# Patient Record
Sex: MALE | Race: WHITE | Employment: OTHER | URBAN - METROPOLITAN AREA
[De-identification: names, ages, dates, MRNs, and addresses within clinical notes are randomized per-mention and may not be internally consistent; named-entity substitution may affect disease eponyms.]

---

## 2017-02-21 ENCOUNTER — TELEPHONE (OUTPATIENT)
Dept: CASE MANAGEMENT | Facility: CLINIC | Age: 62
End: 2017-02-21

## 2017-08-25 DIAGNOSIS — B00.1 RECURRENT COLD SORES: ICD-10-CM

## 2017-08-25 RX ORDER — VALACYCLOVIR HYDROCHLORIDE 500 MG/1
TABLET, FILM COATED ORAL
Qty: 8 TABLET | Refills: 0 | Status: SHIPPED | OUTPATIENT
Start: 2017-08-25 | End: 2017-12-11

## 2017-08-25 NOTE — TELEPHONE ENCOUNTER
Patient calls requesting rx for Valtrex.  States he uses this rx on an as needed basis for cold sores.  He is starting to develop a cold sore now.    Valtrex 500mg      Last Written Prescription Date: 11/6/2011  Last Fill Quantity: 24,  # refills: 2   Last Office Visit with [facility], P or [facility] prescribing provider: 10/3/2016    Prescription approved per [facility] Refill Protocol.  Denise Joy RN

## 2017-12-09 DIAGNOSIS — I10 BENIGN ESSENTIAL HYPERTENSION: ICD-10-CM

## 2017-12-09 RX ORDER — LOSARTAN POTASSIUM AND HYDROCHLOROTHIAZIDE 12.5; 5 MG/1; MG/1
TABLET ORAL
Qty: 90 TABLET | Refills: 3 | OUTPATIENT
Start: 2017-12-09

## 2017-12-11 ENCOUNTER — OFFICE VISIT (OUTPATIENT)
Dept: FAMILY MEDICINE | Facility: CLINIC | Age: 62
End: 2017-12-11
Payer: COMMERCIAL

## 2017-12-11 VITALS
BODY MASS INDEX: 35.26 KG/M2 | SYSTOLIC BLOOD PRESSURE: 153 MMHG | OXYGEN SATURATION: 95 % | TEMPERATURE: 98.8 F | HEART RATE: 79 BPM | DIASTOLIC BLOOD PRESSURE: 82 MMHG | HEIGHT: 72 IN | WEIGHT: 260.3 LBS

## 2017-12-11 DIAGNOSIS — Z00.00 ROUTINE GENERAL MEDICAL EXAMINATION AT A HEALTH CARE FACILITY: Primary | ICD-10-CM

## 2017-12-11 DIAGNOSIS — Z13.0 SCREENING FOR DISORDER OF BLOOD AND BLOOD-FORMING ORGANS: ICD-10-CM

## 2017-12-11 DIAGNOSIS — Z13.1 SCREENING FOR DIABETES MELLITUS: ICD-10-CM

## 2017-12-11 DIAGNOSIS — I10 BENIGN ESSENTIAL HYPERTENSION: ICD-10-CM

## 2017-12-11 DIAGNOSIS — B00.1 RECURRENT COLD SORES: ICD-10-CM

## 2017-12-11 DIAGNOSIS — Z12.5 SCREENING PSA (PROSTATE SPECIFIC ANTIGEN): ICD-10-CM

## 2017-12-11 DIAGNOSIS — Z13.220 LIPID SCREENING: ICD-10-CM

## 2017-12-11 DIAGNOSIS — R39.198 ABNORMALITY OF URINATION: ICD-10-CM

## 2017-12-11 DIAGNOSIS — L42 PITYRIASIS ROSEA: ICD-10-CM

## 2017-12-11 LAB
ALBUMIN SERPL-MCNC: 4 G/DL (ref 3.4–5)
ALP SERPL-CCNC: 53 U/L (ref 40–150)
ALT SERPL W P-5'-P-CCNC: 41 U/L (ref 0–70)
ANION GAP SERPL CALCULATED.3IONS-SCNC: 10 MMOL/L (ref 3–14)
AST SERPL W P-5'-P-CCNC: 17 U/L (ref 0–45)
BILIRUB SERPL-MCNC: 0.4 MG/DL (ref 0.2–1.3)
BUN SERPL-MCNC: 20 MG/DL (ref 7–30)
CALCIUM SERPL-MCNC: 9.4 MG/DL (ref 8.5–10.1)
CHLORIDE SERPL-SCNC: 101 MMOL/L (ref 94–109)
CHOLEST SERPL-MCNC: 206 MG/DL
CO2 SERPL-SCNC: 28 MMOL/L (ref 20–32)
CREAT SERPL-MCNC: 0.76 MG/DL (ref 0.66–1.25)
ERYTHROCYTE [DISTWIDTH] IN BLOOD BY AUTOMATED COUNT: 13.2 % (ref 10–15)
GFR SERPL CREATININE-BSD FRML MDRD: >90 ML/MIN/1.7M2
GLUCOSE SERPL-MCNC: 118 MG/DL (ref 70–99)
HCT VFR BLD AUTO: 44.2 % (ref 40–53)
HDLC SERPL-MCNC: 59 MG/DL
HGB BLD-MCNC: 15 G/DL (ref 13.3–17.7)
LDLC SERPL CALC-MCNC: 132 MG/DL
MCH RBC QN AUTO: 32.5 PG (ref 26.5–33)
MCHC RBC AUTO-ENTMCNC: 33.9 G/DL (ref 31.5–36.5)
MCV RBC AUTO: 96 FL (ref 78–100)
NONHDLC SERPL-MCNC: 147 MG/DL
PLATELET # BLD AUTO: 242 10E9/L (ref 150–450)
POTASSIUM SERPL-SCNC: 4.3 MMOL/L (ref 3.4–5.3)
PROT SERPL-MCNC: 7.6 G/DL (ref 6.8–8.8)
PSA SERPL-ACNC: 0.42 UG/L (ref 0–4)
RBC # BLD AUTO: 4.62 10E12/L (ref 4.4–5.9)
SODIUM SERPL-SCNC: 139 MMOL/L (ref 133–144)
TRIGL SERPL-MCNC: 76 MG/DL
WBC # BLD AUTO: 5.9 10E9/L (ref 4–11)

## 2017-12-11 PROCEDURE — 85027 COMPLETE CBC AUTOMATED: CPT | Performed by: PHYSICIAN ASSISTANT

## 2017-12-11 PROCEDURE — 80053 COMPREHEN METABOLIC PANEL: CPT | Performed by: PHYSICIAN ASSISTANT

## 2017-12-11 PROCEDURE — G0103 PSA SCREENING: HCPCS | Performed by: PHYSICIAN ASSISTANT

## 2017-12-11 PROCEDURE — 80061 LIPID PANEL: CPT | Performed by: PHYSICIAN ASSISTANT

## 2017-12-11 PROCEDURE — 99396 PREV VISIT EST AGE 40-64: CPT | Performed by: PHYSICIAN ASSISTANT

## 2017-12-11 PROCEDURE — 36415 COLL VENOUS BLD VENIPUNCTURE: CPT | Performed by: PHYSICIAN ASSISTANT

## 2017-12-11 RX ORDER — VALACYCLOVIR HYDROCHLORIDE 500 MG/1
500 TABLET, FILM COATED ORAL DAILY
Qty: 90 TABLET | Refills: 0 | Status: ON HOLD | OUTPATIENT
Start: 2017-12-11 | End: 2019-06-03

## 2017-12-11 RX ORDER — VALACYCLOVIR HYDROCHLORIDE 500 MG/1
TABLET, FILM COATED ORAL
Qty: 8 TABLET | Refills: 0 | Status: SHIPPED | OUTPATIENT
Start: 2017-12-11 | End: 2017-12-11

## 2017-12-11 RX ORDER — LOSARTAN POTASSIUM AND HYDROCHLOROTHIAZIDE 12.5; 5 MG/1; MG/1
1 TABLET ORAL DAILY
Qty: 90 TABLET | Refills: 3 | Status: SHIPPED | OUTPATIENT
Start: 2017-12-11 | End: 2018-11-30

## 2017-12-11 NOTE — NURSING NOTE
Chief Complaint   Patient presents with     Physical     fasting       Initial /82 (BP Location: Right arm, Patient Position: Chair, Cuff Size: Adult Large)  Pulse 79  Temp 98.8  F (37.1  C) (Oral)  Ht 6' (1.829 m)  Wt 260 lb 4.8 oz (118.1 kg)  SpO2 95%  BMI 35.3 kg/m2 Estimated body mass index is 35.3 kg/(m^2) as calculated from the following:    Height as of this encounter: 6' (1.829 m).    Weight as of this encounter: 260 lb 4.8 oz (118.1 kg).  Medication Reconciliation: complete      Health Maintenance addressed:  NONE    n/a

## 2017-12-11 NOTE — PATIENT INSTRUCTIONS
Elevated BP today.   Will have come in for nurse only BP check x 2 and then follow-up for office visit in 1 month with BP diary.      ? Urge incontinence ?  Well refer to urology       Preventive Health Recommendations  Male Ages 50   64    Yearly exam:             See your health care provider every year in order to  o   Review health changes.   o   Discuss preventive care.    o   Review your medicines if your doctor has prescribed any.     Have a cholesterol test every 5 years, or more frequently if you are at risk for high cholesterol/heart disease.     Have a diabetes test (fasting glucose) every three years. If you are at risk for diabetes, you should have this test more often.     Have a colonoscopy at age 50, or have a yearly FIT test (stool test). These exams will check for colon cancer.      Talk with your health care provider about whether or not a prostate cancer screening test (PSA) is right for you.    You should be tested each year for STDs (sexually transmitted diseases), if you re at risk.     Shots: Get a flu shot each year. Get a tetanus shot every 10 years.     Nutrition:    Eat at least 5 servings of fruits and vegetables daily.     Eat whole-grain bread, whole-wheat pasta and brown rice instead of white grains and rice.     Talk to your provider about Calcium and Vitamin D.     Lifestyle    Exercise for at least 150 minutes a week (30 minutes a day, 5 days a week). This will help you control your weight and prevent disease.     Limit alcohol to one drink per day.     No smoking.     Wear sunscreen to prevent skin cancer.     See your dentist every six months for an exam and cleaning.     See your eye doctor every 1 to 2 years.

## 2017-12-11 NOTE — MR AVS SNAPSHOT
After Visit Summary   12/11/2017    Dedrick Yi    MRN: [id number]           Patient Information     Date Of Birth          1955        Visit Information        Provider Department      12/11/2017 10:00 AM Aaseby-Aguilera, Ramona Ann, PA-C [facility]        Today's Diagnoses     Routine general medical examination at a health care facility    -  1    Recurrent cold sores        Benign essential hypertension        Screening for diabetes mellitus        Lipid screening        Screening for disorder of blood and blood-forming organs        Screening PSA (prostate specific antigen)        Pityriasis rosea        Abnormality of urination          Care Instructions    Elevated BP today.   Will have come in for nurse only BP check x 2 and then follow-up for office visit in 1 month with BP diary.      ? Urge incontinence ?  Well refer to urology       Preventive Health Recommendations  Male Ages 50 - 64    Yearly exam:             See your health care provider every year in order to  o   Review health changes.   o   Discuss preventive care.    o   Review your medicines if your doctor has prescribed any.     Have a cholesterol test every 5 years, or more frequently if you are at risk for high cholesterol/heart disease.     Have a diabetes test (fasting glucose) every three years. If you are at risk for diabetes, you should have this test more often.     Have a colonoscopy at age 50, or have a yearly FIT test (stool test). These exams will check for colon cancer.      Talk with your health care provider about whether or not a prostate cancer screening test (PSA) is right for you.    You should be tested each year for STDs (sexually transmitted diseases), if you re at risk.     Shots: Get a flu shot each year. Get a tetanus shot every 10 years.     Nutrition:    Eat at least 5 servings of fruits and vegetables daily.     Eat whole-grain bread, whole-wheat pasta and brown rice instead of  white grains and rice.     Talk to your provider about Calcium and Vitamin D.     Lifestyle    Exercise for at least 150 minutes a week (30 minutes a day, 5 days a week). This will help you control your weight and prevent disease.     Limit alcohol to one drink per day.     No smoking.     Wear sunscreen to prevent skin cancer.     See your dentist every six months for an exam and cleaning.     See your eye doctor every 1 to 2 years.            Follow-ups after your visit        Additional Services     UROLOGY ADULT REFERRAL       Your provider has referred you to: [facility]: [facility] [phone]   [email or web address]    Please be aware that coverage of these services is subject to the terms and limitations of your health insurance plan.  Call member services at your health plan with any benefit or coverage questions.      Please bring the following with you to your appointment:    (1) Any X-Rays, CTs or MRIs which have been performed.  Contact the facility where they were done to arrange for  prior to your scheduled appointment.    (2) List of current medications  (3) This referral request   (4) Any documents/labs given to you for this referral                  Who to contact     If you have questions or need follow up information about today's clinic visit or your schedule please contact [facility] directly at [phone].  Normal or non-critical lab and imaging results will be communicated to you by MyChart, letter or phone within 4 business days after the clinic has received the results. If you do not hear from us within 7 days, please contact the clinic through MyChart or phone. If you have a critical or abnormal lab result, we will notify you by phone as soon as possible.  Submit refill requests through PlayPhone or call your pharmacy and they will forward the refill request to us. Please allow 3 business days for your refill to be  completed.          Additional Information About Your Visit        "[employer],"hart Information     [employer] gives you secure access to your electronic health record. If you see a primary care provider, you can also send messages to your care team and make appointments. If you have questions, please call your primary care clinic.  If you do not have a primary care provider, please call [phone] and they will assist you.        Care EveryWhere ID     This is your Care EveryWhere ID. This could be used by other organizations to access your [place] medical records  [id number]        Your Vitals Were     Pulse Temperature Height Pulse Oximetry BMI (Body Mass Index)       79 98.8  F (37.1  C) (Oral) 6' (1.829 m) 95% 35.3 kg/m2        Blood Pressure from Last 3 Encounters:   12/11/17 153/82   10/03/16 133/80   01/18/16 143/83    Weight from Last 3 Encounters:   12/11/17 260 lb 4.8 oz (118.1 kg)   10/03/16 249 lb 4.8 oz (113.1 kg)   01/18/16 257 lb (116.6 kg)              We Performed the Following     CBC with platelets     Comprehensive metabolic panel     Lipid panel reflex to direct LDL Fasting     Prostate spec antigen screen     UROLOGY ADULT REFERRAL          Today's Medication Changes          These changes are accurate as of: 12/11/17 10:43 AM.  If you have any questions, ask your nurse or doctor.               These medicines have changed or have updated prescriptions.        Dose/Directions    valACYclovir 500 MG tablet   Commonly known as:  VALTREX   This may have changed:    - how much to take  - how to take this  - when to take this  - additional instructions   Used for:  Recurrent cold sores   Changed by:  Aaseby-Aguilera, Ramona Ann, PA-C        Dose:  500 mg   Take 1 tablet (500 mg) by mouth daily   Quantity:  90 tablet   Refills:  0            Where to get your medicines      These medications were sent to [facility]/pharmacy #[id number] - [place], [place] - [address]  [address] [facility] [address]    Hours:   [facility] Phone:  [phone]     losartan-hydrochlorothiazide 50-12.5 MG per tablet    valACYclovir 500 MG tablet                Primary Care Provider Office Phone # Fax #    Ramona Ann Aaseby-Aguilera, PA-C [phone] [address]  [facility] [address]        Equal Access to Services     [facility] : Hadii aad ku hadasho Soomaali, waaxda luqadaha, qaybta kaalmada adeegyada, waxay idiin hayaan adeeg kharash la'aan . So [facility] [phone].    ATENCIÓN: Si habla espgurwinder, tiene a romeo disposición servicios gratuitos de asistencia lingüística. Gm al [phone].    We comply with applicable federal civil rights laws and [place] laws. We do not discriminate on the basis of race, color, national origin, age, disability, sex, sexual orientation, or gender identity.            Thank you!     Thank you for choosing [facility]  for your care. Our goal is always to provide you with excellent care. Hearing back from our patients is one way we can continue to improve our services. Please take a few minutes to complete the written survey that you may receive in the mail after your visit with us. Thank you!             Your Updated Medication List - Protect others around you: Learn how to safely use, store and throw away your medicines at www.disposemymeds.org.          This list is accurate as of: 12/11/17 10:43 AM.  Always use your most recent med list.                   Brand Name Dispense Instructions for use Diagnosis    losartan-hydrochlorothiazide 50-12.5 MG per tablet    HYZAAR    90 tablet    Take 1 tablet by mouth daily    Benign essential hypertension       valACYclovir 500 MG tablet    VALTREX    90 tablet    Take 1 tablet (500 mg) by mouth daily    Recurrent cold sores

## 2017-12-11 NOTE — PROGRESS NOTES
SUBJECTIVE:   CC: Dedrick Yi is an 62 year old male who presents for preventative health visit.     Physical   Annual:     Getting at least 3 servings of Calcium per day::  Yes    Bi-annual eye exam::  NO    Dental care twice a year::  Yes    Sleep apnea or symptoms of sleep apnea::  None    Diet::  Low salt    Frequency of exercise::  6-7 days/week    Duration of exercise::  15-30 minutes    Taking medications regularly::  Yes    Additional concerns today::  No                Today's PHQ-2 Score: PHQ-2 ( 1999 Pfizer) 12/11/2017   Q1: Little interest or pleasure in doing things 0   Q2: Feeling down, depressed or hopeless 0   PHQ-2 Score 0   Q1: Little interest or pleasure in doing things Not at all   Q2: Feeling down, depressed or hopeless Not at all   PHQ-2 Score 0       Abuse: Current or Past(Physical, Sexual or Emotional)- No  Do you feel safe in your environment - Yes    Social History   Substance Use Topics     Smoking status: Former Smoker     Quit date: 8/24/1987     Smokeless tobacco: Never Used     Alcohol use Yes      Comment: couple times a week     social    Last PSA:   PSA   Date Value Ref Range Status   11/24/2014 0.53 0 - 4 ug/L Final       Reviewed orders with patient. Reviewed health maintenance and updated orders accordingly - Yes      Reviewed and updated as needed this visit by clinical staff         Reviewed and updated as needed this visit by Provider            Review of Systems  C: NEGATIVE for fever, chills, change in weight  I: NEGATIVE for worrisome rashes, moles or lesions  E: NEGATIVE for vision changes or irritation  ENT: NEGATIVE for ear, mouth and throat problems  R: NEGATIVE for significant cough or SOB  CV: NEGATIVE for chest pain, palpitations or peripheral edema  GI: NEGATIVE for nausea, abdominal pain, heartburn, or change in bowel habits   male: negative for dysuria, hematuria, decreased urinary stream, erectile dysfunction, urethral discharge  M: NEGATIVE for significant  arthralgias or myalgia  N: NEGATIVE for weakness, dizziness or paresthesias  P: NEGATIVE for changes in mood or affect    OBJECTIVE:   There were no vitals taken for this visit.    Physical Exam  GENERAL: healthy, alert and no distress  EYES: Eyes grossly normal to inspection, PERRL and conjunctivae and sclerae normal  HENT: ear canals and TM's normal, nose and mouth without ulcers or lesions  NECK: no adenopathy, no asymmetry, masses, or scars and thyroid normal to palpation  RESP: lungs clear to auscultation - no rales, rhonchi or wheezes  CV: regular rate and rhythm, normal S1 S2, no S3 or S4, no murmur, click or rub, no peripheral edema and peripheral pulses strong  ABDOMEN: soft, nontender, no hepatosplenomegaly, no masses and bowel sounds normal   (male): normal male genitalia without lesions or urethral discharge, no hernia  RECTAL: normal sphincter tone, no rectal masses, prostate normal size, smooth, nontender without nodules or masses  MS: no gross musculoskeletal defects noted, no edema  SKIN: tanning flat oval patches of varying sizes in sy tree distribution   NEURO: Normal strength and tone, mentation intact and speech normal  PSYCH: mentation appears normal, affect normal/bright    ASSESSMENT/PLAN:   1. Routine general medical examination at a health care facility      2. Recurrent cold sores    - valACYclovir (VALTREX) 500 MG tablet; Take 1 tablet (500 mg) by mouth daily  Dispense: 90 tablet; Refill: 0    3. Benign essential hypertension  bp elevated today.   Will have come in for nurse only BP check x 2 and then follow-up for office visit in 1 month with BP diary.      - losartan-hydrochlorothiazide (HYZAAR) 50-12.5 MG per tablet; Take 1 tablet by mouth daily  Dispense: 90 tablet; Refill: 3    4. Screening for diabetes mellitus    - Comprehensive metabolic panel    5. Lipid screening    - Lipid panel reflex to direct LDL Fasting    6. Screening for disorder of blood and blood-forming  organs    - CBC with platelets    7. Screening PSA (prostate specific antigen)    - Prostate spec antigen screen    8. Pityriasis rosea  Reassurances given.      9. Abnormality of urination  Ed reports a sense of urgency periodically and then well pee very little.  Denies decreased stream.  Advised follow-up with urology   - UROLOGY ADULT REFERRAL    COUNSELING:   Reviewed preventive health counseling, as reflected in patient instructions       Regular exercise       Healthy diet/nutrition       Vision screening       Hearing screening       Aspirin Prophylaxsis           reports that he quit smoking about 30 years ago. He has never used smokeless tobacco.      Estimated body mass index is 33.81 kg/(m^2) as calculated from the following:    Height as of 10/3/16: 6' (1.829 m).    Weight as of 10/3/16: 249 lb 4.8 oz (113.1 kg).   Weight management plan: Discussed healthy diet and exercise guidelines and patient will follow up in 12 months in clinic to re-evaluate.    Counseling Resources:  ATP IV Guidelines  Pooled Cohorts Equation Calculator  FRAX Risk Assessment  ICSI Preventive Guidelines  Dietary Guidelines for Americans, 2010  USDA's MyPlate  ASA Prophylaxis  Lung CA Screening    Ramona Ann Aaseby-Aguilera, PA-C  [facility]  Answers for HPI/ROS submitted by the patient on 12/11/2017   PHQ-2 Score: 0

## 2017-12-13 ENCOUNTER — TELEPHONE (OUTPATIENT)
Dept: FAMILY MEDICINE | Facility: CLINIC | Age: 62
End: 2017-12-13

## 2017-12-13 NOTE — TELEPHONE ENCOUNTER
Have him follow up with Dorcas Thursday or Friday as BPs seem to be excessively high. Have him bring his home cuff to compare against ours. JH

## 2017-12-13 NOTE — TELEPHONE ENCOUNTER
"Dedrick Yi is a 62 year old male who calls with Log[employer] home BP's since office visit    BP Readings from Last 1 Encounters:   12/11/17 153/82     On waking today 195/100    Took losartan HCTZ at 7:30 AM, rechecked one hour later and systolic was 170-something\"  At 11:15 AM  159/95 .   Denies symptoms.     States:  I don't know if my machine is accurate, it tends to read about 10 points higher than office readings.  Even if off by 10 points, it is still too high.     Call back [phone]  - OK to leave detailed message.   Please advise.   Pro Yip, RN        "

## 2017-12-15 ENCOUNTER — OFFICE VISIT (OUTPATIENT)
Dept: NURSING | Facility: CLINIC | Age: 62
End: 2017-12-15
Payer: COMMERCIAL

## 2017-12-15 VITALS — SYSTOLIC BLOOD PRESSURE: 130 MMHG | DIASTOLIC BLOOD PRESSURE: 88 MMHG

## 2017-12-15 DIAGNOSIS — Z01.30 BP CHECK: Primary | ICD-10-CM

## 2017-12-15 PROCEDURE — 99207 ZZC NO CHARGE NURSE ONLY: CPT

## 2017-12-15 NOTE — PROGRESS NOTES
"  SUBJECTIVE:   Dedrick Yi is a 62 year old male who presents to clinic today for the following health issues:      Hypertension Follow-up      Outpatient blood pressures {ISCHECKIN}    Low Salt Diet: { :883631::\"no added salt\"}        Amount of exercise or physical activity: {Exercise frequency days per week:289461}    Problems taking medications regularly: {Med Problems:505145::\"No\"}    Medication side effects: {CHRONIC MED SIDE EFFECTS:527156::\"none\"}    Diet: { :474209}        {additional problems for provider to add:431916}    Problem list and histories reviewed & adjusted, as indicated.  Additional history: {NONE - AS DOCUMENTED:709658::\"as documented\"}    {HIST REVIEW/ LINKS 2:814803}    Reviewed and updated as needed this visit by clinical staffAllergies       Reviewed and updated as needed this visit by Provider         {PROVIDER CHARTING PREFERENCE:967883}    "

## 2017-12-15 NOTE — MR AVS SNAPSHOT
After Visit Summary   12/15/2017    Dedrick Yi    MRN: [id number]           Patient Information     Date Of Birth          1955        Visit Information        Provider Department      12/15/2017 10:30 AM EMILIE CORRAL/LPN [facility]        Today's Diagnoses     BP check    -  1       Follow-ups after your visit        Your next 10 appointments already scheduled     Dec 21, 2017  1:30 PM CST   New Patient Visit with Eloy Ward MD   [facility] ([facility])    [address]  [address]  [facility] [address] [phone]              Who to contact     If you have questions or need follow up information about today's clinic visit or your schedule please contact [facility] directly at [phone].  Normal or non-critical lab and imaging results will be communicated to you by MyChart, letter or phone within 4 business days after the clinic has received the results. If you do not hear from us within 7 days, please contact the clinic through MyChart or phone. If you have a critical or abnormal lab result, we will notify you by phone as soon as possible.  Submit refill requests through [employer] or call your pharmacy and they will forward the refill request to us. Please allow 3 business days for your refill to be completed.          Additional Information About Your Visit        MyChart Information     [employer] gives you secure access to your electronic health record. If you see a primary care provider, you can also send messages to your care team and make appointments. If you have questions, please call your primary care clinic.  If you do not have a primary care provider, please call [phone] and they will assist you.        Care EveryWhere ID     This is your Care EveryWhere ID. This could be used by other organizations to access your [place] medical records  [id number]          Blood Pressure from Last 3 Encounters:   12/15/17 130/88   12/11/17 153/82   10/03/16 133/80    Weight from Last 3 Encounters:   12/11/17 260 lb 4.8 oz (118.1 kg)   10/03/16 249 lb 4.8 oz (113.1 kg)   01/18/16 257 lb (116.6 kg)              Today, you had the following     No orders found for display       Primary Care Provider Office Phone # Fax #    Ramona Ann Aaseby-Aguilera, PA-C [phone] [address] [facility] [address] [facility] [address]        Equal Access to Services     [facility] CampusDOTTIE : Hadii stan drake hadasho Solindy, waaxda luqadaha, qaybta kaalmada adeyeisonyada, noe sanders . So [facility] [phone].    ATENCIÓN: Si habla español, tiene a romeo disposición servicios gratuitos de asistencia lingüística. [facility] [phone].    We comply with applicable federal civil rights laws and [place] laws. We do not discriminate on the basis of race, color, national origin, age, disability, sex, sexual orientation, or gender identity.            Thank you!     Thank you for choosing [facility]  for your care. Our goal is always to provide you with excellent care. Hearing back from our patients is one way we can continue to improve our services. Please take a few minutes to complete the written survey that you may receive in the mail after your visit with us. Thank you!             Your Updated Medication List - Protect others around you: Learn how to safely use, store and throw away your medicines at www.disposemymeds.org.          This list is accurate as of: 12/15/17 11:12 AM.  Always use your most recent med list.                   Brand Name Dispense Instructions for use Diagnosis    losartan-hydrochlorothiazide 50-12.5 MG per tablet    HYZAAR    90 tablet    Take 1 tablet by mouth daily    Benign essential hypertension       valACYclovir 500 MG tablet    VALTREX    90 tablet    Take 1 tablet (500 mg) by mouth daily    Recurrent cold sores

## 2018-03-28 ENCOUNTER — TELEPHONE (OUTPATIENT)
Dept: FAMILY MEDICINE | Facility: CLINIC | Age: 63
End: 2018-03-28

## 2018-03-28 NOTE — TELEPHONE ENCOUNTER
Patient calling with question about buying a vaporizer.  States he has had a cold for about a week and now it has settled into his chest.  Denies any other symptoms.  Suggested a humidifier at night, Vicks vapor rub on chest and feet, and add in mucinex to help with the congestion.  If not better in the next few days or worsening cough or fever then should be seen.      Pt expressed understanding and acceptance of the plan.  Pt had no further questions at this time.  Advised can call back to clinic at any time with concerns.       Julio Cesar Rangel RN, BSN

## 2018-05-21 ENCOUNTER — TELEPHONE (OUTPATIENT)
Dept: FAMILY MEDICINE | Facility: CLINIC | Age: 63
End: 2018-05-21

## 2018-05-21 NOTE — TELEPHONE ENCOUNTER
Rash is now in crook of elbow and under breast     Advised should be seen as this is not the same as when seen in December.     Pt scheduled for tomorrow with PCP    Sahra Pepper RN

## 2018-05-21 NOTE — TELEPHONE ENCOUNTER
Freddyhenrry walked into clinic requesting to speak to nurse.     He was last seen 12/11/17 with Dorcas. He showed her  a rash. He wants to know what kind of rash it is.     Requesting an Rx for the rash. I offered him an appt, but he stated she has already seen it.     Please call him at [phone]

## 2018-05-24 ENCOUNTER — OFFICE VISIT (OUTPATIENT)
Dept: FAMILY MEDICINE | Facility: CLINIC | Age: 63
End: 2018-05-24
Payer: COMMERCIAL

## 2018-05-24 VITALS
HEART RATE: 76 BPM | WEIGHT: 264.4 LBS | TEMPERATURE: 98.2 F | HEIGHT: 72 IN | BODY MASS INDEX: 35.81 KG/M2 | SYSTOLIC BLOOD PRESSURE: 130 MMHG | RESPIRATION RATE: 14 BRPM | DIASTOLIC BLOOD PRESSURE: 90 MMHG | OXYGEN SATURATION: 93 %

## 2018-05-24 DIAGNOSIS — A63.0 GENITAL WARTS: ICD-10-CM

## 2018-05-24 DIAGNOSIS — R73.09 ELEVATED GLUCOSE: ICD-10-CM

## 2018-05-24 DIAGNOSIS — L42 PITYRIASIS ROSEA: Primary | ICD-10-CM

## 2018-05-24 DIAGNOSIS — R05.9 COUGH: ICD-10-CM

## 2018-05-24 DIAGNOSIS — J31.0 OTHER CHRONIC RHINITIS: ICD-10-CM

## 2018-05-24 LAB — HBA1C MFR BLD: 5.9 % (ref 0–5.6)

## 2018-05-24 PROCEDURE — 99214 OFFICE O/P EST MOD 30 MIN: CPT | Performed by: PHYSICIAN ASSISTANT

## 2018-05-24 PROCEDURE — 36415 COLL VENOUS BLD VENIPUNCTURE: CPT | Performed by: PHYSICIAN ASSISTANT

## 2018-05-24 PROCEDURE — 83036 HEMOGLOBIN GLYCOSYLATED A1C: CPT | Performed by: PHYSICIAN ASSISTANT

## 2018-05-24 RX ORDER — TRIAMCINOLONE ACETONIDE 1 MG/G
CREAM TOPICAL
Qty: 30 G | Refills: 0 | Status: ON HOLD | OUTPATIENT
Start: 2018-05-24 | End: 2019-06-03

## 2018-05-24 RX ORDER — IMIQUIMOD 12.5 MG/.25G
CREAM TOPICAL
Qty: 12 PACKET | Refills: 3 | Status: ON HOLD | OUTPATIENT
Start: 2018-05-24 | End: 2019-06-03

## 2018-05-24 NOTE — PROGRESS NOTES
SUBJECTIVE:   Dedrick Yi is a 62 year old male who presents to clinic today for the following health issues:      Rash  Onset: since last OCt    Description:   Location: all over   Character: blotchy, red  Itching (Pruritis): YES    Progression of Symptoms:  same    Accompanying Signs & Symptoms:  Fever: no   Body aches or joint pain: no   Sore throat symptoms: no   Recent cold symptoms: yes, since Jan      History:   Previous similar rash: YES    Precipitating factors:   Exposure to similar rash: no   New exposures: None   Recent travel: no     Alleviating factors:    Also, he has additional complaints of gets up at night up to 3 times to urinate.  Started taking a over the counter supplement. That has helped this.  Was given a urology referral for this at last visit.  Wants to be on a medication that his dad takes to control urination.  Flomax?        Therapies Tried and outcome: none         Problem list and histories reviewed & adjusted, as indicated.  Additional history: as documented    Current Outpatient Prescriptions   Medication Sig Dispense Refill     imiquimod (ALDARA) 5 % cream Apply a small sized amount to warts or molluscum three times weekly at bedtime.   Wash off after 8 hours.   May use for up to 16 weeks. 12 packet 3     losartan-hydrochlorothiazide (HYZAAR) 50-12.5 MG per tablet Take 1 tablet by mouth daily 90 tablet 3     triamcinolone (KENALOG) 0.1 % cream Apply sparingly to affected area three times daily for 14 days. 30 g 0     valACYclovir (VALTREX) 500 MG tablet Take 1 tablet (500 mg) by mouth daily 90 tablet 0     BP Readings from Last 3 Encounters:   05/24/18 130/90   12/15/17 130/88   12/11/17 153/82    Wt Readings from Last 3 Encounters:   05/24/18 264 lb 6.4 oz (119.9 kg)   12/11/17 260 lb 4.8 oz (118.1 kg)   10/03/16 249 lb 4.8 oz (113.1 kg)                    Reviewed and updated as needed this visit by clinical staff  Tobacco  Allergies  Meds       Reviewed and updated as  needed this visit by Provider         ROS:  Constitutional, HEENT, cardiovascular, pulmonary, gi and gu systems are negative, except as otherwise noted.    OBJECTIVE:                                                    /90 (BP Location: Right arm, Patient Position: Chair, Cuff Size: Adult Large)  Pulse 76  Temp 98.2  F (36.8  C) (Oral)  Resp 14  Ht 6' (1.829 m)  Wt 264 lb 6.4 oz (119.9 kg)  SpO2 93%  BMI 35.86 kg/m2  Body mass index is 35.86 kg/(m^2).  GENERAL APPEARANCE: healthy, alert and no distress  HENT: ear canals and TM's normal and nose and mouth without ulcers or lesions  RESP: lungs clear to auscultation - no rales, rhonchi or wheezes  CV: regular rates and rhythm, normal S1 S2, no S3 or S4 and no murmur, click or rub  SKIN: pink oval patches on trunk and upper extremeties. In xmas tree distribution          ASSESSMENT/PLAN:                                                    1. Pityriasis rosea  Advised to use sparingly just in elbows where it itches when he gets hot.   - triamcinolone (KENALOG) 0.1 % cream; Apply sparingly to affected area three times daily for 14 days.  Dispense: 30 g; Refill: 0  - SKIN CARE REFERRAL    2. Cough    - triamcinolone (KENALOG) 0.1 % cream; Apply sparingly to affected area three times daily for 14 days.  Dispense: 30 g; Refill: 0    3. Other chronic rhinitis  Advised zyrtec daily     4. Elevated glucose  5.9   - Hemoglobin A1c    5. Genital warts  Has used in the past and workes well.  Now a new lesion   - imiquimod (ALDARA) 5 % cream; Apply a small sized amount to warts or molluscum three times weekly at bedtime.   Wash off after 8 hours.   May use for up to 16 weeks.  Dispense: 12 packet; Refill: 3      There are no Patient Instructions on file for this visit.    Ramona Ann Aaseby-Aguilera, PA-C  [facility]

## 2018-05-24 NOTE — MR AVS SNAPSHOT
After Visit Summary   5/24/2018    Dedrick Yi    MRN: [id number]           Patient Information     Date Of Birth          1955        Visit Information        Provider Department      5/24/2018 9:45 AM Aaseby-Aguilera, Ramona Ann, PA-C [facility]        Today's Diagnoses     Pityriasis rosea    -  1    Cough        Other chronic rhinitis        Elevated glucose           Follow-ups after your visit        Additional Services     SKIN CARE REFERRAL       Your provider has referred you to: [facility]: [facility] [place] [phone]  [email or web address]     Please be aware that coverage of these services is subject to the terms and limitations of your health insurance plan.  Please check with your insurance prior to the appointment to ensure appropriate coverage for any services considered cosmetic in nature or not medically necessary.    Please bring the following with you to your appointment:    (1) Any X-Rays, CTs or MRIs which have been performed.  Contact the facility where they were done to arrange for  prior to your scheduled appointment.  Any new CT, MRI or other procedures ordered by your specialist must be performed at a [facility] facility or coordinated by your clinic's referral office.  (2) List of current medications  (3) This referral request   (4) Any documents/labs given to you for this referral                  Who to contact     If you have questions or need follow up information about today's clinic visit or your schedule please contact [facility] directly at [phone].  Normal or non-critical lab and imaging results will be communicated to you by MyChart, letter or phone within 4 business days after the clinic has received the results. If you do not hear from us within 7 days, please contact the clinic through MyChart or phone. If you have a critical or abnormal lab result, we will  notify you by phone as soon as possible.  Submit refill requests through [email or web address] or call your pharmacy and they will forward the refill request to us. Please allow 3 business days for your refill to be completed.          Additional Information About Your Visit        [employer] Information     [email or web address] gives you secure access to your electronic health record. If you see a primary care provider, you can also send messages to your care team and make appointments. If you have questions, please call your primary care clinic.  If you do not have a primary care provider, please call [phone] and they will assist you.        Care EveryWhere ID     This is your Care EveryWhere ID. This could be used by other organizations to access your [facility] medical records  [id number]        Your Vitals Were     Pulse Temperature Respirations Height Pulse Oximetry BMI (Body Mass Index)    76 98.2  F (36.8  C) (Oral) 14 6' (1.829 m) 93% 35.86 kg/m2       Blood Pressure from Last 3 Encounters:   05/24/18 130/90   12/15/17 130/88   12/11/17 153/82    Weight from Last 3 Encounters:   05/24/18 264 lb 6.4 oz (119.9 kg)   12/11/17 260 lb 4.8 oz (118.1 kg)   10/03/16 249 lb 4.8 oz (113.1 kg)              We Performed the Following     Hemoglobin A1c     SKIN CARE REFERRAL          Today's Medication Changes          These changes are accurate as of 5/24/18 11:15 AM.  If you have any questions, ask your nurse or doctor.               Start taking these medicines.        Dose/Directions    triamcinolone 0.1 % cream   Commonly known as:  KENALOG   Used for:  Pityriasis rosea, Cough   Started by:  Aaseby-Aguilera, Ramona Ann, PA-C        Apply sparingly to affected area three times daily for 14 days.   Quantity:  30 g   Refills:  0            Where to get your medicines      These medications were sent to [facility]/pharmacy #[id number] - [place], [place] - [address]  [address], [facility] [address]    Hours:  [facility]  Phone:  [phone]     triamcinolone 0.1 % cream                Primary Care Provider Office Phone # Fax #    Ramona Ann Aaseby-Aguilera, PA-C [phone] [id number]  [facility] [address]        Equal Access to Services     DES SANCHEZ : Hadii aad ku hadasho Soomaali, waaxda luqadaha, qaybta kaalmada adeegyada, waxay beverly ovidion adeyeison rachel [place]. So [facility] [phone].    ATENCIÓN: Si habla español, tiene a romeo disposición servicios gratuitos de asistencia lingüística. Llame al [phone].    We comply with applicable federal civil rights laws and [place] laws. We do not discriminate on the basis of race, color, national origin, age, disability, sex, sexual orientation, or gender identity.            Thank you!     Thank you for choosing [facility]  for your care. Our goal is always to provide you with excellent care. Hearing back from our patients is one way we can continue to improve our services. Please take a few minutes to complete the written survey that you may receive in the mail after your visit with us. Thank you!             Your Updated Medication List - Protect others around you: Learn how to safely use, store and throw away your medicines at www.disposemymeds.org.          This list is accurate as of 5/24/18 11:15 AM.  Always use your most recent med list.                   Brand Name Dispense Instructions for use Diagnosis    losartan-hydrochlorothiazide 50-12.5 MG per tablet    HYZAAR    90 tablet    Take 1 tablet by mouth daily    Benign essential hypertension       triamcinolone 0.1 % cream    KENALOG    30 g    Apply sparingly to affected area three times daily for 14 days.    Pityriasis rosea, Cough       valACYclovir 500 MG tablet    VALTREX    90 tablet    Take 1 tablet (500 mg) by mouth daily    Recurrent cold sores

## 2018-05-24 NOTE — NURSING NOTE
Chief Complaint   Patient presents with     Derm Problem       Initial /90 (BP Location: Right arm, Patient Position: Chair, Cuff Size: Adult Large)  Pulse 76  Temp 98.2  F (36.8  C) (Oral)  Resp 14  Ht 6' (1.829 m)  Wt 264 lb 6.4 oz (119.9 kg)  SpO2 93%  BMI 35.86 kg/m2 Estimated body mass index is 35.86 kg/(m^2) as calculated from the following:    Height as of this encounter: 6' (1.829 m).    Weight as of this encounter: 264 lb 6.4 oz (119.9 kg).  Medication Reconciliation: complete      Health Maintenance addressed:  NONE    N/a    BELEM Abdalla

## 2018-06-01 DIAGNOSIS — R05.9 COUGH: ICD-10-CM

## 2018-06-01 DIAGNOSIS — L42 PITYRIASIS ROSEA: ICD-10-CM

## 2018-06-01 NOTE — TELEPHONE ENCOUNTER
Spoke with patient and he has been using all over his his body not just in the elbow area as directed.  He is using it on both sides of his back and under his breast area where it is sweaty.  He is using this BID and has not made an appt with Skin Care.  He states he was told to schedule only if this cream is not helping which it is helping.    Please advise    Julio Cesar Rangel RN, BSN

## 2018-06-01 NOTE — TELEPHONE ENCOUNTER
"Requested Prescriptions   Pending Prescriptions Disp Refills     triamcinolone (KENALOG) 0.1 % cream [Pharmacy Med Name: TRIAMCINOLONE 0.1% CREAM] 30 g 0    Last Written Prescription Date:  05/24/2018  Last Fill Quantity: 30 grams,  # refills: 0   Last office visit: 5/24/2018 with prescribing provider:  05/24/2018   Future Office Visit:     Sig: APPLY SPARINGLY TO AFFECTED AREA THREE TIMES DAILY FOR 14 DAYS.    Topical Steroid Protocol Passed    6/1/2018 12:49 PM       Passed - Patient is age 6 or older       Passed - Authorizing prescriber's most recent note related to this medication read.       Passed - High potency steroid not ordered       Passed - Recent (12 mo) or future (30 days) visit within the authorizing provider's specialty    Patient had office visit in the last 12 months or has a visit in the next 30 days with authorizing provider or within the authorizing provider's specialty.  See \"Patient Info\" tab in inbasket, or \"Choose Columns\" in Meds & Orders section of the refill encounter.              Peter Kingston XRT  "

## 2018-06-04 RX ORDER — TRIAMCINOLONE ACETONIDE 1 MG/G
CREAM TOPICAL
Qty: 30 G | Refills: 0 | OUTPATIENT
Start: 2018-06-04

## 2018-06-07 ENCOUNTER — OFFICE VISIT (OUTPATIENT)
Dept: FAMILY MEDICINE | Facility: CLINIC | Age: 63
End: 2018-06-07
Payer: COMMERCIAL

## 2018-06-07 VITALS — HEART RATE: 85 BPM | SYSTOLIC BLOOD PRESSURE: 141 MMHG | OXYGEN SATURATION: 95 % | DIASTOLIC BLOOD PRESSURE: 78 MMHG

## 2018-06-07 DIAGNOSIS — B36.0 TINEA VERSICOLOR: Primary | ICD-10-CM

## 2018-06-07 DIAGNOSIS — L30.9 DERMATITIS: ICD-10-CM

## 2018-06-07 LAB
KOH PREP SPEC: ABNORMAL
SPECIMEN SOURCE: ABNORMAL

## 2018-06-07 PROCEDURE — 99214 OFFICE O/P EST MOD 30 MIN: CPT | Mod: 25 | Performed by: FAMILY MEDICINE

## 2018-06-07 PROCEDURE — 87220 TISSUE EXAM FOR FUNGI: CPT | Performed by: FAMILY MEDICINE

## 2018-06-07 RX ORDER — FLUCONAZOLE 150 MG/1
300 TABLET ORAL
Qty: 8 TABLET | Refills: 0 | Status: SHIPPED | OUTPATIENT
Start: 2018-06-07 | End: 2018-06-29

## 2018-06-07 NOTE — PROGRESS NOTES
[facility] - PRIMARY CARE SKIN    CC : Rash   SUBJECTIVE:   Dedrick Yi is a 62 year old male who presents to clinic today because of a(n) rash beginning approximately 1 year ago on the back. His primary care provider diagnosed it as pityriasis rosea when evaluated in Dec 2017. The rash began to spread onto arms and become itchy.    Pruritic : YES - itchy in antecubital fossae and side of chest.  Symptoms appear to be : worsening.  Aggravating factors : sweating.  Relieving factors : none identified.    Previous similar history : NO.  Recent exposure history : none known. He does wood working in an A/C shop.  Any other family members with similar symptoms : NO.  Other current medications : No new medications.    Therapies tried : triamcinolone 0.1% cream. Minimal relief of itchiness.  Products used : Dove body lotion and body wash.    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)    Patient Active Problem List   Diagnosis     Plantar fascial fibromatosis     Erectile dysfunction     CARDIOVASCULAR SCREENING; LDL GOAL LESS THAN 130     Neck pain     Recurrent cold sores     Hypertension goal BP (blood pressure) < 140/90     Advanced directives, counseling/discussion     Impaired fasting glucose     Low back pain     Anal warts       Past Medical History:   Diagnosis Date     Anal warts 1/21/2015     NO ACTIVE PROBLEMS     Past Surgical History:   Procedure Laterality Date     HC TOOTH EXTRACTION W/FORCEP       SURGICAL HISTORY OF -       cervical disk herniation      Social History   Substance Use Topics     Smoking status: Former Smoker     Quit date: 8/24/1987     Smokeless tobacco: Never Used     Alcohol use Yes      Comment: couple times a week    Family History     Problem (# of Occurrences) Relation (Name,Age of Onset)     Family History Negative (1) Father    Hypertension (1) Mother           Prescription Medications as of 6/7/2018             imiquimod (ALDARA) 5 % cream Apply a small sized amount to warts or molluscum three times weekly at bedtime.   Wash off after 8 hours.   May use for up to 16 weeks.    losartan-hydrochlorothiazide (HYZAAR) 50-12.5 MG per tablet Take 1 tablet by mouth daily    triamcinolone (KENALOG) 0.1 % cream Apply sparingly to affected area three times daily for 14 days.    valACYclovir (VALTREX) 500 MG tablet Take 1 tablet (500 mg) by mouth daily          No Known Allergies     INTEGUMENTARY/SKIN: POSITIVE for rash    ROS : 14 point review of systems was negative except the symptoms listed above in the HPI.    This document serves as a record of the services and decisions personally performed and made by Akilah Buck MD. It was created on her behalf by Cristian Vallejo, a trained medical scribe.  The creation of this document is based on the scribe's personal observations and the provider's statements to the medical scribe.  Cristian Vallejo, June 7, 2018 10:49 AM      OBJECTIVE:   GENERAL: healthy, alert and no distress  SKIN: Cohen Skin Type - II.  Neck, Trunk and Arms were examined. The dermatoscope was used to help evaluate pigmented lesions.  Skin Pertinent Findings:  Trunk : Macular brownish-red patches involving most of the upper back and scattered distribution  onto lower back and extending onto anterior chest and upper chest most consistent with tinea versicolor    Legs, arms, dorsa of hands, face : Clear.    Diagnostic Test Results:  No results found for this or any previous visit (from the past 24 hour(s)).          ASSESSMENT:     Encounter Diagnoses   Name Primary?     Dermatitis Yes     Tinea versicolor          PLAN:   Patient Instructions   FUTURE APPOINTMENTS  Follow up as needed.    Discontinue use of topical steroid.    FLUCONAZOLE FOR TINEA VERSICOLOR INSTRUCTIONS  1. Take 2 capsules/tablets  of fluconazole 150 mg with a carbonated beverage or orange juice. This increases the absorption by your body of the medication.    2. One hour after taking, exercise to a light sweat, as the medication works best when secreted in sweat. Do not shower for 6 hours, to allow the medication time to work.    3. Repeat this same process every 7 days for 4 weeks in total.     Note: Although the problem has been adequately treated, the skin discoloration may not resolve for several weeks.    Recurrent Cases:  It is very common for this to recur with exercise or hot weather. If this continues to be a problem, Selsun blue, ketoconazole or another shampoo may be used as a body wash once a week or so to prevent it from coming back.    The patient was counseled to use products free of fragrance, dyes, and plants. The importance of using bland cleansers and the regular use of heavy bland emollient creams was impressed upon the patient.      PROCEDURES:   None.    TT : 20 minutes.  CT : 15 minutes.      The information in this document, created by the medical scribe for me, accurately reflects the services I personally performed and the decisions made by me. I have reviewed and approved this document for accuracy prior to leaving the patient care area.  Akilah Buck MD June 7, 2018 10:49 AM  [facility]

## 2018-06-07 NOTE — PATIENT INSTRUCTIONS
FUTURE APPOINTMENTS  Follow up as needed.    Discontinue use of topical steroid.    FLUCONAZOLE FOR TINEA VERSICOLOR INSTRUCTIONS  1. Take 2 capsules/tablets of fluconazole 150 mg with a carbonated beverage or orange juice. This increases the absorption by your body of the medication.    2. One hour after taking, exercise to a light sweat, as the medication works best when secreted in sweat. Do not shower for 6 hours, to allow the medication time to work.    3. Repeat this same process every 7 days for 4 weeks in total.     Note: Although the problem has been adequately treated, the skin discoloration may not resolve for several weeks.    Recurrent Cases:  It is very common for this to recur with exercise or hot weather. If this continues to be a problem, Selsun blue, ketoconazole or another shampoo may be used as a body wash once a week or so to prevent it from coming back.

## 2018-06-07 NOTE — LETTER
6/7/2018         RE: Dedrick Yi  [address] [place] [address]        Dear Colleague,    Thank you for referring your patient, Dedrick Yi, to the [facility] [place]. Please see a copy of my visit note below.    [facility] - PRIMARY CARE SKIN    CC : Rash   SUBJECTIVE:   Dedrick Yi is a 62 year old male who presents to clinic today because of a(n) rash beginning approximately 1 year ago on the back. His primary care provider diagnosed it as pityriasis rosea when evaluated in Dec 2017. The rash began to spread onto arms and become itchy.    Pruritic : YES - itchy in antecubital fossae and side of chest.  Symptoms appear to be : worsening.  Aggravating factors : sweating.  Relieving factors : none identified.    Previous similar history : NO.  Recent exposure history : none known. He does wood working in an A/C shop.  Any other family members with similar symptoms : NO.  Other current medications : No new medications.    Therapies tried : triamcinolone 0.1% cream. Minimal relief of itchiness.  Products used : Dove body lotion and body wash.    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)    Patient Active Problem List   Diagnosis     Plantar fascial fibromatosis     Erectile dysfunction     CARDIOVASCULAR SCREENING; LDL GOAL LESS THAN 130     Neck pain     Recurrent cold sores     Hypertension goal BP (blood pressure) < 140/90     Advanced directives, counseling/discussion     Impaired fasting glucose     Low back pain     Anal warts       Past Medical History:   Diagnosis Date     Anal warts 1/21/2015     NO ACTIVE PROBLEMS     Past Surgical History:   Procedure Laterality Date     HC TOOTH EXTRACTION W/FORCEP       SURGICAL HISTORY OF -       cervical disk herniation      Social History    Substance Use Topics     Smoking status: Former Smoker     Quit date: 8/24/1987     Smokeless tobacco: Never Used     Alcohol use Yes      Comment: couple times a week    Family History     Problem (# of Occurrences) Relation (Name,Age of Onset)    Family History Negative (1) Father    Hypertension (1) Mother           Prescription Medications as of 6/7/2018             imiquimod (ALDARA) 5 % cream Apply a small sized amount to warts or molluscum three times weekly at bedtime.   Wash off after 8 hours.   May use for up to 16 weeks.    losartan-hydrochlorothiazide (HYZAAR) 50-12.5 MG per tablet Take 1 tablet by mouth daily    triamcinolone (KENALOG) 0.1 % cream Apply sparingly to affected area three times daily for 14 days.    valACYclovir (VALTREX) 500 MG tablet Take 1 tablet (500 mg) by mouth daily          No Known Allergies     INTEGUMENTARY/SKIN: POSITIVE for rash    ROS : 14 point review of systems was negative except the symptoms listed above in the HPI.    This document serves as a record of the services and decisions personally performed and made by Akilah Buck MD. It was created on her behalf by Cristian Vallejo, a trained medical scribe.  The creation of this document is based on the scribe's personal observations and the provider's statements to the medical scribe.  Cristian Vallejo, June 7, 2018 10:49 AM      OBJECTIVE:   GENERAL: healthy, alert and no distress  SKIN: Cohen Skin Type - II.  Neck, Trunk and Arms were examined. The dermatoscope was used to help evaluate pigmented lesions.  Skin Pertinent Findings:  Trunk : Macular brownish-red patches involving most of the upper back and scattered distribution  onto lower back and extending onto anterior chest and upper chest most consistent with tinea versicolor    Legs, arms, dorsa of hands, face : Clear.    Diagnostic Test Results:  No results found for this or any previous visit (from the past 24 hour(s)).          ASSESSMENT:     Encounter Diagnoses    Name Primary?     Dermatitis Yes     Tinea versicolor          PLAN:   Patient Instructions   FUTURE APPOINTMENTS  Follow up as needed.    Discontinue use of topical steroid.    FLUCONAZOLE FOR TINEA VERSICOLOR INSTRUCTIONS  1. Take 2 capsules/tablets of fluconazole 150 mg with a carbonated beverage or orange juice. This increases the absorption by your body of the medication.    2. One hour after taking, exercise to a light sweat, as the medication works best when secreted in sweat. Do not shower for 6 hours, to allow the medication time to work.    3. Repeat this same process every 7 days for 4 weeks in total.     Note: Although the problem has been adequately treated, the skin discoloration may not resolve for several weeks.    Recurrent Cases:  It is very common for this to recur with exercise or hot weather. If this continues to be a problem, Selsun blue, ketoconazole or another shampoo may be used as a body wash once a week or so to prevent it from coming back.    The patient was counseled to use products free of fragrance, dyes, and plants. The importance of using bland cleansers and the regular use of heavy bland emollient creams was impressed upon the patient.      PROCEDURES:   None.    TT : 20 minutes.  CT : 15 minutes.      The information in this document, created by the medical scribe for me, accurately reflects the services I personally performed and the decisions made by me. I have reviewed and approved this document for accuracy prior to leaving the patient care area.  Akilah Buck MD June 7, 2018 10:49 AM  [facility] [place]    Again, thank you for allowing me to participate in the care of your patient.        Sincerely,        Dinah Buck MD

## 2018-06-07 NOTE — MR AVS SNAPSHOT
After Visit Summary   6/7/2018    Dedrick Yi    MRN: [id number]           Patient Information     Date Of Birth          1955        Visit Information        Provider Department      6/7/2018 10:40 AM Dinah Buck MD [facility]        Today's Diagnoses     Dermatitis    -  1    Tinea versicolor          Care Instructions    FUTURE APPOINTMENTS  Follow up as needed.    Discontinue use of topical steroid.    FLUCONAZOLE FOR TINEA VERSICOLOR INSTRUCTIONS  1. Take 2 capsules/tablets of fluconazole 150 mg with a carbonated beverage or orange juice. This increases the absorption by your body of the medication.    2. One hour after taking, exercise to a light sweat, as the medication works best when secreted in sweat. Do not shower for 6 hours, to allow the medication time to work.    3. Repeat this same process every 7 days for 4 weeks in total.     Note: Although the problem has been adequately treated, the skin discoloration may not resolve for several weeks.    Recurrent Cases:  It is very common for this to recur with exercise or hot weather. If this continues to be a problem, Selsun blue, ketoconazole or another shampoo may be used as a body wash once a week or so to prevent it from coming back.          Follow-ups after your visit        Who to contact     If you have questions or need follow up information about today's clinic visit or your schedule please contact [facility] directly at [phone].  Normal or non-critical lab and imaging results will be communicated to you by MyChart, letter or phone within 4 business days after the clinic has received the results. If you do not hear from us within 7 days, please contact the clinic through MyChart or phone. If you have a critical or abnormal lab result, we will notify you by phone as soon as possible.  Submit refill requests through [facility] or call your pharmacy and they will forward the  refill request to us. Please allow 3 business days for your refill to be completed.          Additional Information About Your Visit        MyChart Information     [employer] gives you secure access to your electronic health record. If you see a primary care provider, you can also send messages to your care team and make appointments. If you have questions, please call your primary care clinic.  If you do not have a primary care provider, please call [phone] and they will assist you.        Care EveryWhere ID     This is your Care EveryWhere ID. This could be used by other organizations to access your [place] medical records  [id number]        Your Vitals Were     Pulse Pulse Oximetry                85 95%           Blood Pressure from Last 3 Encounters:   06/07/18 156/85   05/24/18 130/90   12/15/17 130/88    Weight from Last 3 Encounters:   05/24/18 264 lb 6.4 oz (119.9 kg)   12/11/17 260 lb 4.8 oz (118.1 kg)   10/03/16 249 lb 4.8 oz (113.1 kg)              We Performed the Following     KOH skin hair or nails        Primary Care Provider Office Phone # Fax #    Dorcas Ann Aaseby-Aguilera, PA-C [phone] [id number]  [facility] [id number]        Equal Access to Services     DES SANCHEZ : Hadii aad ku hadasho Soomaali, waaxda luqadaha, qaybta kaalmada adeegyada, waxay idiin hayshajin adeyeison khvictorina shore. So [facility] [phone].    ATENCIÓN: Si habla español, tiene a romeo disposición servicios gratuitos de asistencia lingüística. Llame al [phone].    We comply with applicable federal civil rights laws and [place] laws. We do not discriminate on the basis of race, color, national origin, age, disability, sex, sexual orientation, or gender identity.            Thank you!     Thank you for choosing [facility]  for your care. Our goal is always to provide you with excellent care. Hearing back from our patients is one way we can continue to improve our services. Please  take a few minutes to complete the written survey that you may receive in the mail after your visit with us. Thank you!             Your Updated Medication List - Protect others around you: Learn how to safely use, store and throw away your medicines at www.disposemymeds.org.          This list is accurate as of 6/7/18 11:16 AM.  Always use your most recent med list.                   Brand Name Dispense Instructions for use Diagnosis    imiquimod 5 % cream    ALDARA    12 packet    Apply a small sized amount to warts or molluscum three times weekly at bedtime.   Wash off after 8 hours.   May use for up to 16 weeks.    Genital warts       losartan-hydrochlorothiazide 50-12.5 MG per tablet    HYZAAR    90 tablet    Take 1 tablet by mouth daily    Benign essential hypertension       triamcinolone 0.1 % cream    KENALOG    30 g    Apply sparingly to affected area three times daily for 14 days.    Pityriasis rosea, Cough       valACYclovir 500 MG tablet    VALTREX    90 tablet    Take 1 tablet (500 mg) by mouth daily    Recurrent cold sores

## 2018-11-19 ENCOUNTER — TELEPHONE (OUTPATIENT)
Dept: OTHER | Facility: CLINIC | Age: 63
End: 2018-11-19

## 2018-11-30 DIAGNOSIS — I10 BENIGN ESSENTIAL HYPERTENSION: ICD-10-CM

## 2018-11-30 RX ORDER — LOSARTAN POTASSIUM AND HYDROCHLOROTHIAZIDE 12.5; 5 MG/1; MG/1
TABLET ORAL
Qty: 90 TABLET | Refills: 0 | Status: SHIPPED | OUTPATIENT
Start: 2018-11-30 | End: 2019-02-11

## 2018-11-30 NOTE — TELEPHONE ENCOUNTER
Medication is being filled for 1 time refill only due to:  Patient needs to be seen because need px and labs in December.  Mychart sent. Julio Cesar Rangel RN, BSN

## 2018-11-30 NOTE — TELEPHONE ENCOUNTER
"Requested Prescriptions   Pending Prescriptions Disp Refills     losartan-hydrochlorothiazide (HYZAAR) 50-12.5 MG tablet [Pharmacy Med Name: LOSARTAN-HCTZ 50-12.5 MG TAB] 90 tablet 3    Last Written Prescription Date:  12/11/2017  Last Fill Quantity: 90 tablet,  # refills: 3   Last office visit: 6/7/2018 with prescribing provider:  05/24/2018   Future Office Visit:     Sig: TAKE 1 TABLET BY MOUTH DAILY    Angiotensin-II Receptors Failed    11/30/2018  2:05 AM       Failed - Blood pressure under 140/90 in past 12 months    BP Readings from Last 3 Encounters:   06/07/18 141/78   05/24/18 130/90   12/15/17 130/88                Passed - Recent (12 mo) or future (30 days) visit within the authorizing provider's specialty    Patient had office visit in the last 12 months or has a visit in the next 30 days with authorizing provider or within the authorizing provider's specialty.  See \"Patient Info\" tab in inbasket, or \"Choose Columns\" in Meds & Orders section of the refill encounter.             Passed - Patient is age 18 or older       Passed - Normal serum creatinine on file in past 12 months    Recent Labs   Lab Test  12/11/17   1057   CR  0.76            Passed - Normal serum potassium on file in past 12 months    Recent Labs   Lab Test  12/11/17   1057   POTASSIUM  4.3                      Peter GARZA  "

## 2019-02-11 ENCOUNTER — OFFICE VISIT (OUTPATIENT)
Dept: FAMILY MEDICINE | Facility: CLINIC | Age: 64
End: 2019-02-11
Payer: COMMERCIAL

## 2019-02-11 VITALS
DIASTOLIC BLOOD PRESSURE: 80 MMHG | WEIGHT: 263.9 LBS | OXYGEN SATURATION: 95 % | HEART RATE: 74 BPM | BODY MASS INDEX: 34.97 KG/M2 | TEMPERATURE: 98.7 F | SYSTOLIC BLOOD PRESSURE: 138 MMHG | HEIGHT: 73 IN

## 2019-02-11 DIAGNOSIS — Z11.59 NEED FOR HEPATITIS C SCREENING TEST: ICD-10-CM

## 2019-02-11 DIAGNOSIS — I10 HYPERTENSION GOAL BP (BLOOD PRESSURE) < 140/90: ICD-10-CM

## 2019-02-11 DIAGNOSIS — I10 BENIGN ESSENTIAL HYPERTENSION: ICD-10-CM

## 2019-02-11 DIAGNOSIS — E66.01 MORBID OBESITY (H): ICD-10-CM

## 2019-02-11 DIAGNOSIS — Z00.00 ROUTINE GENERAL MEDICAL EXAMINATION AT A HEALTH CARE FACILITY: ICD-10-CM

## 2019-02-11 LAB
CHOLEST SERPL-MCNC: 193 MG/DL
CREAT UR-MCNC: 83 MG/DL
ERYTHROCYTE [DISTWIDTH] IN BLOOD BY AUTOMATED COUNT: 13.4 % (ref 10–15)
HCT VFR BLD AUTO: 44.3 % (ref 40–53)
HCV AB SERPL QL IA: NONREACTIVE
HDLC SERPL-MCNC: 52 MG/DL
HGB BLD-MCNC: 14.7 G/DL (ref 13.3–17.7)
LDLC SERPL CALC-MCNC: 127 MG/DL
MCH RBC QN AUTO: 31.5 PG (ref 26.5–33)
MCHC RBC AUTO-ENTMCNC: 33.2 G/DL (ref 31.5–36.5)
MCV RBC AUTO: 95 FL (ref 78–100)
MICROALBUMIN UR-MCNC: 6 MG/L
MICROALBUMIN/CREAT UR: 7.58 MG/G CR (ref 0–17)
NONHDLC SERPL-MCNC: 141 MG/DL
PLATELET # BLD AUTO: 251 10E9/L (ref 150–450)
RBC # BLD AUTO: 4.66 10E12/L (ref 4.4–5.9)
TRIGL SERPL-MCNC: 69 MG/DL
TSH SERPL DL<=0.005 MIU/L-ACNC: 1.38 MU/L (ref 0.4–4)
WBC # BLD AUTO: 6.5 10E9/L (ref 4–11)

## 2019-02-11 PROCEDURE — 84443 ASSAY THYROID STIM HORMONE: CPT | Performed by: PHYSICIAN ASSISTANT

## 2019-02-11 PROCEDURE — 99396 PREV VISIT EST AGE 40-64: CPT | Performed by: PHYSICIAN ASSISTANT

## 2019-02-11 PROCEDURE — 85027 COMPLETE CBC AUTOMATED: CPT | Performed by: PHYSICIAN ASSISTANT

## 2019-02-11 PROCEDURE — 36415 COLL VENOUS BLD VENIPUNCTURE: CPT | Performed by: PHYSICIAN ASSISTANT

## 2019-02-11 PROCEDURE — 82043 UR ALBUMIN QUANTITATIVE: CPT | Performed by: PHYSICIAN ASSISTANT

## 2019-02-11 PROCEDURE — 80061 LIPID PANEL: CPT | Performed by: PHYSICIAN ASSISTANT

## 2019-02-11 PROCEDURE — 86803 HEPATITIS C AB TEST: CPT | Performed by: PHYSICIAN ASSISTANT

## 2019-02-11 RX ORDER — LOSARTAN POTASSIUM AND HYDROCHLOROTHIAZIDE 12.5; 5 MG/1; MG/1
1 TABLET ORAL DAILY
Qty: 90 TABLET | Refills: 3 | Status: ON HOLD | OUTPATIENT
Start: 2019-02-11 | End: 2019-06-03

## 2019-02-11 ASSESSMENT — ENCOUNTER SYMPTOMS
ABDOMINAL PAIN: 0
DIARRHEA: 0
DIZZINESS: 0
NERVOUS/ANXIOUS: 0
COUGH: 0
FEVER: 0
HEMATURIA: 0
HEMATOCHEZIA: 0
EYE PAIN: 0
CHILLS: 0
CONSTIPATION: 0

## 2019-02-11 ASSESSMENT — MIFFLIN-ST. JEOR: SCORE: 2032.98

## 2019-02-11 NOTE — PROGRESS NOTES
SUBJECTIVE:   CC: Dedrick Yi is an 63 year old male who presents for preventative health visit.     Physical   Annual:     Getting at least 3 servings of Calcium per day:  NO    Bi-annual eye exam:  NO    Dental care twice a year:  NO    Sleep apnea or symptoms of sleep apnea:  None    Diet:  Regular (no restrictions)    Frequency of exercise:  2-3 days/week    Duration of exercise:  15-30 minutes    Taking medications regularly:  Yes    Medication side effects:  None    Additional concerns today:  No    PHQ-2 Total Score: 0              Today's PHQ-2 Score:   PHQ-2 (  [employer]) 2019   Q1: Little interest or pleasure in doing things 0   Q2: Feeling down, depressed or hopeless 0   PHQ-2 Score 0   Q1: Little interest or pleasure in doing things Not at all   Q2: Feeling down, depressed or hopeless Not at all   PHQ-2 Score 0       Abuse: Current or Past(Physical, Sexual or Emotional)- No  Do you feel safe in your environment? Yes    Social History     Tobacco Use     Smoking status: Former Smoker     Last attempt to quit: 1987     Years since quittin.4     Smokeless tobacco: Never Used   Substance Use Topics     Alcohol use: Yes     Comment: couple times a week     Alcohol Use 2019   If you drink alcohol do you typically have greater than 3 drinks per day OR greater than 7 drinks per week? Not Applicable   No flowsheet data found.    Last PSA:   PSA   Date Value Ref Range Status   2017 0.42 0 - 4 ug/L Final     Comment:     Assay Method:  Chemiluminescence using Siemens Vista analyzer       Reviewed orders with patient. Reviewed health maintenance and updated orders accordingly - Yes  BP Readings from Last 3 Encounters:   19 138/80   18 141/78   18 130/90    Wt Readings from Last 3 Encounters:   19 119.7 kg (263 lb 14.4 oz)   18 119.9 kg (264 lb 6.4 oz)   17 118.1 kg (260 lb 4.8 oz)                  Recent Labs   Lab Test 18  1045 17  1057  12/07/15  1535 11/24/14  1630 05/17/14  0943  01/05/13  0905   A1C 5.9*  --  5.8 6.1* 5.8   < >  --    LDL  --  132*  --   --  128  --  130*   HDL  --  59  --   --  38*  --  44   TRIG  --  76  --   --  68  --  114   ALT  --  41  --   --   --   --   --    CR  --  0.76 0.96  --  0.88   < > 0.75   GFRESTIMATED  --  >90 80  --  89   < > >90   GFRESTBLACK  --  >90 >90  African American GFR Calc    --  >90   < > >90   POTASSIUM  --  4.3 4.2  --  4.2   < > 3.9    < > = values in this interval not displayed.        Reviewed and updated as needed this visit by clinical staff         Reviewed and updated as needed this visit by Provider            Review of Systems  CONSTITUTIONAL: NEGATIVE for fever, chills, change in weight  INTEGUMENTARY/SKIN: NEGATIVE for worrisome rashes, moles or lesions  EYES: NEGATIVE for vision changes or irritation  ENT: NEGATIVE for ear, mouth and throat problems  RESP: NEGATIVE for significant cough or SOB  CV: NEGATIVE for chest pain, palpitations or peripheral edema  GI: NEGATIVE for nausea, abdominal pain, heartburn, or change in bowel habits   male: negative for dysuria, hematuria, decreased urinary stream, erectile dysfunction, urethral discharge  MUSCULOSKELETAL: NEGATIVE for significant arthralgias or myalgia  NEURO: NEGATIVE for weakness, dizziness or paresthesias  PSYCHIATRIC: NEGATIVE for changes in mood or affect    OBJECTIVE:   There were no vitals taken for this visit.    Physical Exam  GENERAL: healthy, alert and no distress  EYES: Eyes grossly normal to inspection, PERRL and conjunctivae and sclerae normal  HENT: ear canals and TM's normal, nose and mouth without ulcers or lesions  NECK: no adenopathy, no asymmetry, masses, or scars and thyroid normal to palpation  RESP: lungs clear to auscultation - no rales, rhonchi or wheezes  CV: regular rate and rhythm, normal S1 S2, no S3 or S4, no murmur, click or rub, no peripheral edema and peripheral pulses strong  ABDOMEN: soft,  nontender, no hepatosplenomegaly, no masses and bowel sounds normal  MS: no gross musculoskeletal defects noted, no edema  SKIN: no suspicious lesions or rashes  NEURO: Normal strength and tone, mentation intact and speech normal  PSYCH: mentation appears normal, affect normal/bright  LYMPH: no cervical, supraclavicular, axillary, or inguinal adenopathy    Diagnostic Test Results:  none     ASSESSMENT/PLAN:         COUNSELING:   Reviewed preventive health counseling, as reflected in patient instructions       Regular exercise       Healthy diet/nutrition       Vision screening       Aspirin Prophylaxsis    BP Readings from Last 1 Encounters:   06/07/18 141/78     Estimated body mass index is 35.86 kg/m  as calculated from the following:    Height as of 5/24/18: 1.829 m (6').    Weight as of 5/24/18: 119.9 kg (264 lb 6.4 oz).      Weight management plan: Discussed healthy diet and exercise guidelines     reports that he quit smoking about 31 years ago. he has never used smokeless tobacco.      Counseling Resources:  ATP IV Guidelines  Pooled Cohorts Equation Calculator  FRAX Risk Assessment  ICSI Preventive Guidelines  Dietary Guidelines for Americans, 2010  Graffle's MyPlate  ASA Prophylaxis  Lung CA Screening    Ramona Ann Aaseby-Aguilera, PA-C  [facility]    Patient Instructions     Preventive Health Recommendations  Male Ages 50 - 64    Yearly exam:             See your health care provider every year in order to  o   Review health changes.   o   Discuss preventive care.    o   Review your medicines if your doctor has prescribed any.     Have a cholesterol test every 5 years, or more frequently if you are at risk for high cholesterol/heart disease.     Have a diabetes test (fasting glucose) every three years. If you are at risk for diabetes, you should have this test more often.     Have a colonoscopy at age 50, or have a yearly FIT test (stool test). These exams will check for colon cancer.       Talk with your health care provider about whether or not a prostate cancer screening test (PSA) is right for you.    You should be tested each year for STDs (sexually transmitted diseases), if you re at risk.     Shots: Get a flu shot each year. Get a tetanus shot every 10 years.     Nutrition:    Eat at least 5 servings of fruits and vegetables daily.     Eat whole-grain bread, whole-wheat pasta and brown rice instead of white grains and rice.     Get adequate Calcium and Vitamin D.     Lifestyle    Exercise for at least 150 minutes a week (30 minutes a day, 5 days a week). This will help you control your weight and prevent disease.     Limit alcohol to one drink per day.     No smoking.     Wear sunscreen to prevent skin cancer.     See your dentist every six months for an exam and cleaning.     See your eye doctor every 1 to 2 years.

## 2019-03-25 ENCOUNTER — OFFICE VISIT (OUTPATIENT)
Dept: FAMILY MEDICINE | Facility: CLINIC | Age: 64
End: 2019-03-25
Payer: COMMERCIAL

## 2019-03-25 VITALS
RESPIRATION RATE: 16 BRPM | WEIGHT: 261 LBS | HEART RATE: 69 BPM | TEMPERATURE: 98.6 F | SYSTOLIC BLOOD PRESSURE: 160 MMHG | OXYGEN SATURATION: 97 % | BODY MASS INDEX: 34.91 KG/M2 | DIASTOLIC BLOOD PRESSURE: 87 MMHG

## 2019-03-25 DIAGNOSIS — R07.81 RIB PAIN ON LEFT SIDE: ICD-10-CM
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
## 2019-03-25 PROCEDURE — 99213 OFFICE O/P EST LOW 20 MIN: CPT | Performed by: PHYSICIAN ASSISTANT

## 2019-03-25 RX ORDER — CYCLOBENZAPRINE HCL 10 MG
10 TABLET ORAL 3 TIMES DAILY PRN
Qty: 30 TABLET | Refills: 1 | Status: ON HOLD | OUTPATIENT
Start: 2019-03-25 | End: 2019-06-03

## 2019-03-25 NOTE — PATIENT INSTRUCTIONS
Take Flexeril as needed up to 3 times a day for pain.    You can take 600 mg of ibuprofen 2-3 times a day as needed for pain.    Apply ice 3 times a day for 20 minutes at a time today and tomorrow. After that, switch to heat.

## 2019-03-25 NOTE — PROGRESS NOTES
SUBJECTIVE:   Dedrick Yi is a 63 year old male who presents to clinic today for the following health issues:      Joint Pain    Onset: 1 day     Description:   Location: Left ribs   Character: Sharp    Intensity: moderate    Progression of Symptoms: better    Accompanying Signs & Symptoms:  Other symptoms: none    History:   Previous similar pain: no       Precipitating factors:   Trauma or overuse: YES- fall yesterday and landed on left side- primarily on arm  Pain increases with deep breaths, coughs, and laughing     Alleviating factors:  Improved by: nothing    Therapies Tried and outcome: none       Problem list and histories reviewed & adjusted, as indicated.  Additional history: as documented    Patient Active Problem List   Diagnosis     Plantar fascial fibromatosis     Erectile dysfunction     CARDIOVASCULAR SCREENING; LDL GOAL LESS THAN 130     Neck pain     Recurrent cold sores     Hypertension goal BP (blood pressure) < 140/90     Advanced directives, counseling/discussion     Impaired fasting glucose     Low back pain     Anal warts     Obesity (BMI 35.0-39.9) with comorbidity (H)     Past Surgical History:   Procedure Laterality Date     HC TOOTH EXTRACTION W/FORCEP       SURGICAL HISTORY OF -       cervical disk herniation       Social History     Tobacco Use     Smoking status: Former Smoker     Last attempt to quit: 1987     Years since quittin.6     Smokeless tobacco: Never Used   Substance Use Topics     Alcohol use: Yes     Comment: couple times a week     Family History   Problem Relation Age of Onset     Hypertension Mother      Family History Negative Father          Current Outpatient Medications   Medication Sig Dispense Refill     losartan-hydrochlorothiazide (HYZAAR) 50-12.5 MG tablet Take 1 tablet by mouth daily 90 tablet 3     imiquimod (ALDARA) 5 % cream Apply a small sized amount to warts or molluscum three times weekly at bedtime.   Wash off after 8 hours.   May use  for up to 16 weeks. (Patient not taking: Reported on 2/11/2019) 12 packet 3     triamcinolone (KENALOG) 0.1 % cream Apply sparingly to affected area three times daily for 14 days. (Patient not taking: Reported on 2/11/2019) 30 g 0     valACYclovir (VALTREX) 500 MG tablet Take 1 tablet (500 mg) by mouth daily (Patient not taking: Reported on 3/25/2019) 90 tablet 0     No Known Allergies    Reviewed and updated as needed this visit by clinical staff       Reviewed and updated as needed this visit by Provider         ROS:  Constitutional, HEENT, cardiovascular, pulmonary, gi and gu systems are negative, except as otherwise noted.    OBJECTIVE:     /87 (BP Location: Right arm, Patient Position: Chair, Cuff Size: Adult Large)   Pulse 69   Temp 98.6  F (37  C) (Oral)   Resp 16   Wt 118.4 kg (261 lb)   SpO2 97%   BMI 34.91 kg/m    Body mass index is 34.91 kg/m .  GENERAL: healthy, alert and no distress  EYES: Eyes grossly normal to inspection, PERRL and conjunctivae and sclerae normal  RESP: lungs clear to auscultation - no rales, rhonchi or wheezes  CV: regular rate and rhythm, normal S1 S2, no S3 or S4, no murmur, click or rub, no peripheral edema and peripheral pulses strong  MS: no gross musculoskeletal defects noted, no edema  SKIN: no suspicious lesions or rashes  NEURO: Normal strength and tone, mentation intact and speech normal  PSYCH: mentation appears normal, affect normal/bright  Chest: Tender to palpation of ribs and intercostal muscles under left axilla and wrapping to chest.    Diagnostic Test Results:  none - patient declined x-ray today    ASSESSMENT/PLAN:       (W19.XXXA) Fall, initial encounter  (primary encounter diagnosis)    Comment: Treat with Flexeril, ibuprofen, ice, and heat. Follow-up if not improving in 1-2 weeks or sooner if worsening.    Plan: cyclobenzaprine (FLEXERIL) 10 MG tablet            (R07.81) Rib pain on left side    Comment: See above.    Plan: cyclobenzaprine (FLEXERIL)  10 MG tablet              Patient Instructions   Take Flexeril as needed up to 3 times a day for pain.    You can take 600 mg of ibuprofen 2-3 times a day as needed for pain.    Apply ice 3 times a day for 20 minutes at a time today and tomorrow. After that, switch to heat.      Feliz Guillen PA-C  [facility]

## 2019-05-02 ENCOUNTER — OFFICE VISIT (OUTPATIENT)
Dept: FAMILY MEDICINE | Facility: CLINIC | Age: 64
End: 2019-05-02
Payer: COMMERCIAL

## 2019-05-02 VITALS
DIASTOLIC BLOOD PRESSURE: 82 MMHG | HEART RATE: 80 BPM | WEIGHT: 262.7 LBS | HEIGHT: 73 IN | BODY MASS INDEX: 34.82 KG/M2 | SYSTOLIC BLOOD PRESSURE: 122 MMHG | OXYGEN SATURATION: 95 % | TEMPERATURE: 99.1 F

## 2019-05-02 DIAGNOSIS — R30.0 DYSURIA: Primary | ICD-10-CM

## 2019-05-02 DIAGNOSIS — Z12.11 SPECIAL SCREENING FOR MALIGNANT NEOPLASMS, COLON: ICD-10-CM

## 2019-05-02 DIAGNOSIS — R91.8 PULMONARY NODULES: ICD-10-CM

## 2019-05-02 DIAGNOSIS — R10.31 RLQ ABDOMINAL PAIN: ICD-10-CM

## 2019-05-02 DIAGNOSIS — R10.2 PELVIC CRAMPING: ICD-10-CM

## 2019-05-02 LAB
ALBUMIN UR-MCNC: NEGATIVE MG/DL
APPEARANCE UR: CLEAR
BILIRUB UR QL STRIP: NEGATIVE
COLOR UR AUTO: YELLOW
GLUCOSE UR STRIP-MCNC: NEGATIVE MG/DL
HGB UR QL STRIP: NEGATIVE
KETONES UR STRIP-MCNC: NEGATIVE MG/DL
LEUKOCYTE ESTERASE UR QL STRIP: NEGATIVE
NITRATE UR QL: NEGATIVE
PH UR STRIP: 7 PH (ref 5–7)
SOURCE: NORMAL
SP GR UR STRIP: 1.01 (ref 1–1.03)
UROBILINOGEN UR STRIP-ACNC: 0.2 EU/DL (ref 0.2–1)

## 2019-05-02 PROCEDURE — 81003 URINALYSIS AUTO W/O SCOPE: CPT | Performed by: PHYSICIAN ASSISTANT

## 2019-05-02 PROCEDURE — 99214 OFFICE O/P EST MOD 30 MIN: CPT | Performed by: PHYSICIAN ASSISTANT

## 2019-05-02 ASSESSMENT — MIFFLIN-ST. JEOR: SCORE: 2027.54

## 2019-05-02 NOTE — PATIENT INSTRUCTIONS
(R30.0) Dysuria  (primary encounter diagnosis)  Comment:   Plan: **UA reflex to Microscopic FUTURE 2mo            (R10.2) Pelvic cramping  Comment:   Plan: CT Chest Abdomen Pelvis w/o & w Contrast            (R10.31) RLQ abdominal pain  Comment:   Plan: CT Chest Abdomen Pelvis w/o & w Contrast            (R91.8) Pulmonary nodules  Comment:   Plan: CT Chest Abdomen Pelvis w/o & w Contrast            (Z12.11) Special screening for malignant neoplasms, colon  Comment:   Plan: GASTROENTEROLOGY ADULT REF PROCEDURE ONLY         Rylan Mendoza [phone]; [facility]          Call [phone] to make appointment

## 2019-05-02 NOTE — PROGRESS NOTES
SUBJECTIVE:   Dedrick Yi is a 63 year old male who presents to clinic today for the following   health issues:    Has had chronic lower abdominal cramping on right side for years off and on.  Now cramping across lower abdomen.  No changes in stools or nausea or fever.     Had a Ct of abdomin in 2016 and shown to have diverticulosis in colon boh ascending and descending. Also on Ct they noticed a lung nodule and recommended repeat ct in 1 year     tried to get a colonoscopy  In 2016 but bowel prep was not adequate so not useful . Was supposed to repeat right away     ABDOMINAL   PAIN     Onset: 3 to 4 years  - getting worse now    Description:   Character: Cramping  Location: right lower quadrant left lower quadrant  Radiation: None    Intensity: moderate    Progression of Symptoms:  Worsening - not a lot of pain    Accompanying Signs & Symptoms:  Fever/Chills?: no   Gas/Bloating: YES - bloating   Nausea: no   Vomitting: no   Diarrhea?: no   Constipation:no   Dysuria or Hematuria: no    History:   Trauma: did fall a few weeks ago    Previous similar pain: no    Previous tests done: MRI    Precipitating factors:   Does the pain change with:     Food: no  - feel it alttle bit more after eating   BM: no     Urination: no     Alleviating factors:      Therapies Tried and outcome:     LMP:  not applicable           Additional history: as documented    Reviewed  and updated as needed this visit by clinical staff         Reviewed and updated as needed this visit by Provider         Current Outpatient Medications   Medication Sig Dispense Refill     cyclobenzaprine (FLEXERIL) 10 MG tablet Take 1 tablet (10 mg) by mouth 3 times daily as needed for muscle spasms 30 tablet 1     losartan-hydrochlorothiazide (HYZAAR) 50-12.5 MG tablet Take 1 tablet by mouth daily 90 tablet 3     valACYclovir (VALTREX) 500 MG tablet Take 1 tablet (500 mg) by mouth daily 90 tablet 0     imiquimod (ALDARA) 5 % cream Apply a small sized  "amount to warts or molluscum three times weekly at bedtime.   Wash off after 8 hours.   May use for up to 16 weeks. (Patient not taking: Reported on 2/11/2019) 12 packet 3     triamcinolone (KENALOG) 0.1 % cream Apply sparingly to affected area three times daily for 14 days. (Patient not taking: Reported on 2/11/2019) 30 g 0     Recent Labs   Lab Test 02/11/19  1005 05/24/18  1045 12/11/17  1057 12/07/15  1535 11/24/14  1630 05/17/14  0943   A1C  --  5.9*  --  5.8 6.1* 5.8   *  --  132*  --   --  128   HDL 52  --  59  --   --  38*   TRIG 69  --  76  --   --  68   ALT  --   --  41  --   --   --    CR  --   --  0.76 0.96  --  0.88   GFRESTIMATED  --   --  >90 80  --  89   GFRESTBLACK  --   --  >90 >90  African American GFR Calc    --  >90   POTASSIUM  --   --  4.3 4.2  --  4.2   TSH 1.38  --   --   --   --   --       BP Readings from Last 3 Encounters:   05/02/19 122/82   03/25/19 160/87   02/11/19 138/80    Wt Readings from Last 3 Encounters:   05/02/19 119.2 kg (262 lb 11.2 oz)   03/25/19 118.4 kg (261 lb)   02/11/19 119.7 kg (263 lb 14.4 oz)                    ROS:  Constitutional, HEENT, cardiovascular, pulmonary, gi and gu systems are negative, except as otherwise noted.    OBJECTIVE:                                                    /82 (BP Location: Right arm, Patient Position: Chair, Cuff Size: Adult Large)   Pulse 80   Temp 99.1  F (37.3  C) (Oral)   Ht 1.842 m (6' 0.5\")   Wt 119.2 kg (262 lb 11.2 oz)   SpO2 95%   BMI 35.14 kg/m    Body mass index is 35.14 kg/m .  GENERAL APPEARANCE: healthy, alert and no distress  HENT: ear canals and TM's normal and nose and mouth without ulcers or lesions  RESP: lungs clear to auscultation - no rales, rhonchi or wheezes  CV: regular rates and rhythm, normal S1 S2, no S3 or S4 and no murmur, click or rub  LYMPHATICS: no cervical adenopathy  ABDOMEN: soft, nontender, without hepatosplenomegaly or masses and bowel sounds normal  MS: extremities normal- no " gross deformities noted  SKIN: no suspicious lesions or rashes         ASSESSMENT/PLAN:                                                    1. Dysuria    - **UA reflex to Microscopic FUTURE 2mo; Future  - **UA reflex to Microscopic FUTURE 2mo    2. Pelvic cramping    - CT Chest Abdomen Pelvis w/o & w Contrast    3. RLQ abdominal pain    - CT Chest Abdomen Pelvis w/o & w Contrast    4. Pulmonary nodules    - CT Chest Abdomen Pelvis w/o & w Contrast    5. Special screening for malignant neoplasms, colon    - GASTROENTEROLOGY ADULT REF PROCEDURE ONLY Rylan Mendoza [phone]; [facility]      Well need to get CT to evaluate lung nodule as well os pelvic cramping. Will contact patient with results when known and determine plan      Well set up colonoscopy     Ramona Ann Aaseby-Aguilera, PA-C  [facility]

## 2019-05-09 ENCOUNTER — HOSPITAL ENCOUNTER (OUTPATIENT)
Dept: CT IMAGING | Facility: CLINIC | Age: 64
Discharge: HOME | End: 2019-05-09
Attending: PHYSICIAN ASSISTANT | Admitting: PHYSICIAN ASSISTANT
Payer: COMMERCIAL

## 2019-05-09 PROCEDURE — 25000128 H RX IP 250 OP 636: Performed by: RADIOLOGY

## 2019-05-09 PROCEDURE — 74177 CT ABD & PELVIS W/CONTRAST: CPT

## 2019-05-09 PROCEDURE — 71260 CT THORAX DX C+: CPT

## 2019-05-09 RX ORDER — IOPAMIDOL 755 MG/ML
500 INJECTION, SOLUTION INTRAVASCULAR ONCE
Status: COMPLETED | OUTPATIENT
Start: 2019-05-09 | End: 2019-05-09

## 2019-05-09 RX ADMIN — IOPAMIDOL 100 ML: 755 INJECTION, SOLUTION INTRAVENOUS at 13:09

## 2019-05-09 RX ADMIN — SODIUM CHLORIDE 65 ML: 9 INJECTION, SOLUTION INTRAVENOUS at 13:09

## 2019-05-16 ENCOUNTER — TELEPHONE (OUTPATIENT)
Dept: FAMILY MEDICINE | Facility: CLINIC | Age: 64
End: 2019-05-16

## 2019-05-16 NOTE — TELEPHONE ENCOUNTER
Please let pt know :                                                                  IMPRESSION:   1. No evidence of active pulmonary disease.  2. No cause of acute pain identified in the abdomen or pelvis.  3. Colonic diverticulosis, without evidence of diverticulitis.

## 2019-05-21 ENCOUNTER — TELEPHONE (OUTPATIENT)
Dept: FAMILY MEDICINE | Facility: CLINIC | Age: 64
End: 2019-05-21

## 2019-05-21 DIAGNOSIS — I10 HYPERTENSION GOAL BP (BLOOD PRESSURE) < 140/90: ICD-10-CM

## 2019-05-21 RX ORDER — HYDROCHLOROTHIAZIDE 12.5 MG/1
12.5 TABLET ORAL DAILY
Qty: 90 TABLET | Refills: 0 | Status: ON HOLD | OUTPATIENT
Start: 2019-05-21 | End: 2019-06-03

## 2019-05-21 NOTE — TELEPHONE ENCOUNTER
[facility] pharmacy calling to verify the dosing on the hydrochlorothiazide that was called in today.  The comb pill is on back order so they wanted the losartan and hydrochlorothiazide separate.  Pt was on hydrochlorothiazide 12.5 in the combo pill but the script sent over today is for a total of 50 mg.    Is this correct?    Julio Cesar Rangel RN, BSN

## 2019-06-03 ENCOUNTER — HOSPITAL ENCOUNTER (OUTPATIENT)
Facility: CLINIC | Age: 64
Discharge: HOME | End: 2019-06-03
Attending: INTERNAL MEDICINE | Admitting: INTERNAL MEDICINE
Payer: COMMERCIAL

## 2019-06-03 VITALS
WEIGHT: 260 LBS | SYSTOLIC BLOOD PRESSURE: 143 MMHG | HEART RATE: 69 BPM | HEIGHT: 74 IN | OXYGEN SATURATION: 92 % | DIASTOLIC BLOOD PRESSURE: 91 MMHG | BODY MASS INDEX: 33.37 KG/M2 | RESPIRATION RATE: 16 BRPM

## 2019-06-03 LAB — COLONOSCOPY: NORMAL

## 2019-06-03 PROCEDURE — 45385 COLONOSCOPY W/LESION REMOVAL: CPT | Mod: PT | Performed by: INTERNAL MEDICINE

## 2019-06-03 PROCEDURE — 88305 TISSUE EXAM BY PATHOLOGIST: CPT | Performed by: INTERNAL MEDICINE

## 2019-06-03 PROCEDURE — 88305 TISSUE EXAM BY PATHOLOGIST: CPT | Mod: 26,59 | Performed by: INTERNAL MEDICINE

## 2019-06-03 PROCEDURE — 25000128 H RX IP 250 OP 636: Performed by: INTERNAL MEDICINE

## 2019-06-03 PROCEDURE — G0500 MOD SEDAT ENDO SERVICE >5YRS: HCPCS | Performed by: INTERNAL MEDICINE

## 2019-06-03 PROCEDURE — 00000159 ZZHCL STATISTIC H-SEND OUTS PREP: Performed by: INTERNAL MEDICINE

## 2019-06-03 RX ORDER — ONDANSETRON 2 MG/ML
4 INJECTION INTRAMUSCULAR; INTRAVENOUS EVERY 6 HOURS PRN
Status: DISCONTINUED | OUTPATIENT
Start: 2019-06-03 | End: 2019-06-03 | Stop reason: HOSPADM

## 2019-06-03 RX ORDER — ONDANSETRON 4 MG/1
4 TABLET, ORALLY DISINTEGRATING ORAL EVERY 6 HOURS PRN
Status: CANCELLED | OUTPATIENT
Start: 2019-06-03

## 2019-06-03 RX ORDER — NALOXONE HYDROCHLORIDE 0.4 MG/ML
.1-.4 INJECTION, SOLUTION INTRAMUSCULAR; INTRAVENOUS; SUBCUTANEOUS
Status: DISCONTINUED | OUTPATIENT
Start: 2019-06-03 | End: 2019-06-03 | Stop reason: HOSPADM

## 2019-06-03 RX ORDER — NALOXONE HYDROCHLORIDE 0.4 MG/ML
.1-.4 INJECTION, SOLUTION INTRAMUSCULAR; INTRAVENOUS; SUBCUTANEOUS
Status: CANCELLED | OUTPATIENT
Start: 2019-06-03 | End: 2019-06-04

## 2019-06-03 RX ORDER — ONDANSETRON 2 MG/ML
4 INJECTION INTRAMUSCULAR; INTRAVENOUS
Status: DISCONTINUED | OUTPATIENT
Start: 2019-06-03 | End: 2019-06-03 | Stop reason: HOSPADM

## 2019-06-03 RX ORDER — FENTANYL CITRATE 50 UG/ML
INJECTION, SOLUTION INTRAMUSCULAR; INTRAVENOUS PRN
Status: DISCONTINUED | OUTPATIENT
Start: 2019-06-03 | End: 2019-06-03 | Stop reason: HOSPADM

## 2019-06-03 RX ORDER — ONDANSETRON 2 MG/ML
4 INJECTION INTRAMUSCULAR; INTRAVENOUS EVERY 6 HOURS PRN
Status: CANCELLED | OUTPATIENT
Start: 2019-06-03

## 2019-06-03 RX ORDER — FLUMAZENIL 0.1 MG/ML
0.2 INJECTION, SOLUTION INTRAVENOUS
Status: DISCONTINUED | OUTPATIENT
Start: 2019-06-03 | End: 2019-06-03 | Stop reason: HOSPADM

## 2019-06-03 RX ORDER — FLUMAZENIL 0.1 MG/ML
0.2 INJECTION, SOLUTION INTRAVENOUS
Status: CANCELLED | OUTPATIENT
Start: 2019-06-03 | End: 2019-06-04

## 2019-06-03 RX ORDER — ONDANSETRON 4 MG/1
4 TABLET, ORALLY DISINTEGRATING ORAL EVERY 6 HOURS PRN
Status: DISCONTINUED | OUTPATIENT
Start: 2019-06-03 | End: 2019-06-03 | Stop reason: HOSPADM

## 2019-06-03 RX ORDER — LIDOCAINE 40 MG/G
CREAM TOPICAL
Status: DISCONTINUED | OUTPATIENT
Start: 2019-06-03 | End: 2019-06-03 | Stop reason: HOSPADM

## 2019-06-03 ASSESSMENT — MIFFLIN-ST. JEOR: SCORE: 2039.1

## 2019-06-03 NOTE — LETTER
May 29, 2019      Dedrick Yi  [address] [facility] [id number]           Thank you for choosing [facility]. You are scheduled for the following service(s).       Date: Linda 3, 2019      Procedure:    Colonoscopy   Doctor:       Sayda     Arrival Time:   2pm  *check in at Emergency/Endoscopy desk*  Procedure Time:   230pm    Location:   [facility]      Endoscopy Department, First Floor (Enter through ER Doors) *       [address] [place], [place] [address]    [phone] or [phone] () to reschedule       MIRALAX/GATORADE DOUBLE PREP    Purchase the following supplies at your local pharmacy:    2 (two)- Bisacodyl tablets   (Dulcolax  laxative NOT Dulcolax  stool softener) each tablet contains 5 mg of bisacodyl  2 (two) - 8.3 ounce bottle of Polyethylene Glycol (PEG) 3350 Powder   (MiraLAX, SmoothLAX, ClearLAX or generic equivalent)  128 oz. Gatorade  (No red colored flavors)  Regular Gatorade , Gatorade G2 , Powerade , Powerade Zero  or Pedialyte  are acceptable. Red flavors are not allowed; all other colors (yellow, green, orange, purple, blue) are okay. It is also okay to buy two 2.12 oz packets of powdered Gatorade that can be mixed with water to a total volume of 64 oz of liquid.  1 - 10 oz. bottle Magnesium Citrate (No red colored flavors)  It is also okay for you to use a 0.5 ounce package of powdered magnesium citrate (17 grams) mixed with 10 ounces of water.        PREPARATION FOR COLONOSCOPY    Transportation:  You must arrange for a ride for the day of your procedure with a responsible adult. A taxi ride is not an option unless you are accompanied by a responsible adult. If you fail to arrange transportation with a responsible adult, your procedure will be cancelled and rescheduled.      7 days before:    Discontinue fiber supplements and medications containing iron. This includes multivitamins with iron, Metamucil and  Fibercon.     3 days before:     Begin a Low-Fiber Diet. A low fiber diet helps make the cleanout more effective.     Examples of a low fiber diet include (but are not limited to): White bread, white rice, pasta, crackers, fish, chicken, eggs, ground beef, creamy peanut butter, cooked/steamed/boiled vegetables, canned fruit, bananas, melons, milk, plain yogurt cheese, salad dressing and other condiments.     The following are not allowed on a low fiber diet: Seeds, nuts, popcorn, bran, whole wheat, corn, quinoa, raw fruits and vegetables, berries and dried fruit, beans and lentils.    For additional details on following a low fiber diet, please see attached information sheet    2 days before:    Stop eating solid foods in the morning.    Begin Clear Liquid Diet. (Clear liquids include things you can see through).     Examples of a clear liquid diet include: Water, tea , coffee ,no milk or non-dairy creamer), clear broth or bouillon, Gatorade, Pedialyte or Powerade, carbonated and non-carbonated soft drinks(Sprite , 7-Up , Gingerale), strained fruit juices without pulp (apple, white grape, white cranberry), Jello-O  and popsicles     The following are not allowed on a clear liquid diet: Red liquids, alcoholic beverages, coffee, dairy products, protein shakes, cream broths, juice with pulp and chewing tobacco.    Between 4-6pm: Drink Miralax - Gatorade preparation, mix 1 (one) bottle of Miralax with 64 oz. of Gatorade in a large pitcher.  Drink 1 (one) - 8 oz. glass every 15 minutes thereafter until the mixture is gone.      1 day before:    Continue clear liquid diet    At noon: Take 2 Bisacodyl (Dulcolax) tablets     Between 4-6pm: Drink Miralax - Gatorade preparation Between 4-6pm: Drink Miralax - Gatorade preparation, mix 1 (one) bottle of Miralax with 64 oz. of Gatorade in a large pitcher.    Drink 1 (one) - 8 oz. glass every 15 minutes thereafter until the mixture is gone.    Colon Cleansing Tips: Drink adequate  amounts of fluid before and after your colon cleansing to prevent dehydration. Stay near a toilet because you will have diarrhea. Even if you are sitting on the toilet, continue to drink the cleansing solution every 15 minutes. If you feel nauseous or vomit, rinse your mouth with water, take a 15 to 30-minute break and then continue drinking the solution. You will be uncomfortable until the stool has flushed from your colon (in about 2-4 hours). You may feel chilled.    Day of your procedure:  You may take all of your morning medications including blood pressure medications, blood thinners (if you have not been instructed to stop these by our office), methadone, anti-seizure medications with sips of water 3 hours prior to your procedure or earlier. Do not take insulin or vitamins prior to your procedure.  Continue the Clear Liquid Diet. Avoid red liquids, alcoholic beverages, coffee, dairy products, protein shakes, and chewing tobacco.      4 hours prior: Drink 10 oz magnesium citrate     3 hours prior:     STOP consuming all liquids     Do not take anything by mouth during this time.     Allow extra time to travel to your procedure as you may need to stop and use a restroom along the way.    You are ready for the procedure, if you followed all instructions and your stool is no longer formed, but clear or yellow liquid. If you are unsure whether your colon is clean, please call our office at [phone] before you leave for your appointment.      Canceling or Rescheduling your appointment, please call [phone] as soon as possible.        Bring the following to your procedure:    Insurance Card / Photo ID     List of Current Medications including over-the-counter medications and supplements     Bring your rescue inhaler if you currently use one to control asthma     DIRECTIONS    From the north ([place], [place], [place])  Take [address] south, exit to [address] . Get into the left hand reese, turn left  (east), go one-half mile to [address]. Turn left (north) on [address]. Go north to first stoplight, take a right on the newly constructed road, [address] and follow it to the Emergency Entrance  From the south ([facility])  Take 35 north to the east split, [address], and exit to [address]. Turn left (west) on [address]  to [address]. Turn right (north) on [address]. Go north to first stoplight, take a right on the newly constructed road, [address] and [facility] it [facility]   From the east via [address] ([facility])  Take [address] south to [address]  exit. Turn right on [address]. Go west to [address]. Turn right (north) on [address], go to the first stoplight, take a right and follow the newly constructed road, [id number], to the Emergency Entrance   From the east via [address] ([facility])  Take [address] west to [address]. Turn left (south) on [address] to [address]. Turn left (east) on [address] and follow the newly constructed road [facility]   From the west via [address] ([place], [place])  Take [address] east to [address]. Turn right (south) on [address] to [address].  Turn left (east) on [address] and follow the newly constructed road Emergency Entrance      PRE-PROCEDURE CHECKLIST    If you have diabetes, ask your regular doctor for diet and medication restrictions.  If you take a medication to thin your blood (such as Coumadin or Lovenox) and have not already discussed this please call your primary doctor to advice on holding this medication  If you take aspirin or Plavix,  you may continue to do so.  If you are or may be pregnant, please discuss the risks and benefits of this procedure with your doctor.        You must arrange for a ride for the day of your exam. If you fail to arrange transportation with a responsible adult, your procedure will need to be  cancelled and rescheduled. Taxi ,Bus and Medical transport are not acceptable unless you have a responsible adult that you know & trust with you.  Please arrange for this  to be able to pick you up in our department, approximately one hour after your scheduled procedure, if they are not able to stay with you.  *If you must cancel or reschedule your appointment, please call Endoscopy  at [phone].*      What happens during a colonoscopy?    Plan to spend up to two hours, starting at registration time, at the endoscopy center the day of your procedure. The exam itself takes about 15 minutes to complete, and recovery 30 minutes.     Before the exam:    You will change into a gown.    Your medical history will be reviewed with you and you will be given a consent form to sign.     A nurse will insert an intravenous (IV) line into your hand or arm.    During the exam:     Medicine will be given through the IV line to help you relax and feel drowsy.     Your heart rate and oxygen levels will be monitored. If your blood pressure is low, you may be given fluids through the IV line.     The doctor will insert a flexible hollow tube, called a colonoscope, into your rectum.   o The scope will be advanced slowly through the large intestine (colon).    You may have a feeling of pressure or fullness.     If an abnormal tissue, or a polyp are found, the doctor may remove it through the endoscope for closer examination, or biopsy. Tissue removal is painless.    What happens after the exam?        Your doctor will talk with you about the initial results of your exam.    The doctor will prepare a full report for the physician who referred you for the colonoscopy.    You may feel bloated after the procedure. This is normal.     Medication given during the exam will prohibit you from driving for the rest of the day.     Following the exam, you may resume your normal diet. Your first meal should be light, no greasy  foods. Avoid alcohol until the next day.     You may resume your regular activities the day after the procedure.     A nurse will provide you with complete discharge instructions before you leave the endoscopy center. Be sure to ask the nurse for specific instructions if you take blood thinners such as aspirin, Coumadin or Plavix.     Any tissue samples removed during the exam will be sent to a lab for evaluation. It may take 5-7 working days for you to be notified of the results.       Low-Fiber Diet    A low-fiber diet limits the amount of food waste that has to move through the large intestine.    Foods Recommended Foods to Avoid   Breads, Cereal, Rice and Pasta:   White bread, rolls, biscuits, and croissant, adriano toast   Waffles, Singaporean toast, and pancakes   White rice, noodles, pasta, macaroni and peeled cooked potatoes   Plain crackers, Saltines   Cooked cereals: farina, Cream of Rice   Cold cereals: Puffed Rice, Rice Krispies, Corn Flakes, and Special K Breads, Cereal, Rice and Pasta:   Breads or rolls with nuts, seeds or fruit   Whole wheat, pumpernickel, rye breads and cornbread   Potatoes with skin, brown or wild rice, and kasha (buckwheat)     Vegetables:    Tender cooked and canned vegetables without seeds: carrots, asparagus tips, green or wax beans, pumpkin, spinach, lima beans   Vegetables:   Raw or steamed vegetables   Vegetables with seeds   Sauerkraut   Winter squash, peas, broccoli, Pickett sprouts, cabbage, onions, cauliflower, baked beans, peas and corn     Fruits:   Strained fruit juice   canned fruit, except pineapple   ripe bananas   melons Fruits:   prunes and prune juice   raw or dried fruit   all berries, figs, dates and raisins     Milk/Dairy:   milk, plain or flavored   yogurt, custard, and ice cream   cheese and cottage cheese Milk/Dairy:   Yogurt with nuts or seeds   Meat and other proteins:   ground, wellcooked tender beef, lamb, ham, veal, pork, fish, poultry, and organ  meats   eggs   peanut butter without nuts  Fats, Snack, Sweets, Condiments, and Beverages:   Margarine, butter, oils, mayonnaise, sour cream, and salad dressing   Plain graviesbouillon, broth, and soups made with allowed vegetables   Coffee, tea, and carbonated drinks   Plain cakes and cookies   Gelatin, plain puddings, custard, ice cream, sherbet, Popsicles   Hard candy or pretzels   Ketchup, mustard   Sugar, clear jelly, honey, and syrup   Spices, cooked herbs,    Meat and other proteins:   tough, fibrous meats with gristle   dry beans, peas, and lentils   peanut butter with nuts   Tofu  Fats, Snack, Sweets, Condiments, and Beverages:   Nuts, seeds, and coconut   Jam, marmalade, and preserves   Pickles, olives, relish, and horseradish   All desserts containing nuts, seeds, dried fruit, coconut, or made from whole grains or bran   Candy made with nuts or seeds   popcorn

## 2019-06-03 NOTE — H&P
Pre-Endoscopy History and Physical     Dedrick Yi MRN# [id number]   YOB: 1955 Age: 63 year old     Date of Procedure: 6/3/2019  Primary care provider: Aaseby-Aguilera, Ramona Ann  Type of Endoscopy: Colonoscopy with possible biopsy, possible polypectomy  Reason for Procedure: pain  Type of Anesthesia Anticipated: Conscious Sedation    HPI:    Dedrick is a 63 year old male who will be undergoing the above procedure.      A history and physical has been performed. The patient's medications and allergies have been reviewed. The risks and benefits of the procedure and the sedation options and risks were discussed with the patient.  All questions were answered and informed consent was obtained.      He denies a personal or family history of anesthesia complications or bleeding disorders.     Patient Active Problem List   Diagnosis     Plantar fascial fibromatosis     Erectile dysfunction     CARDIOVASCULAR SCREENING; LDL GOAL LESS THAN 130     Neck pain     Recurrent cold sores     Hypertension goal BP (blood pressure) < 140/90     Advanced directives, counseling/discussion     Impaired fasting glucose     Low back pain     Anal warts     Obesity (BMI 35.0-39.9) with comorbidity (H)        Past Medical History:   Diagnosis Date     Anal warts 2015     Colon polyp      Hypertension         Past Surgical History:   Procedure Laterality Date     AS LAP,INGUINAL HERNIA REPR,INITIAL      right     COLONOSCOPY       HC TOOTH EXTRACTION W/FORCEP       SURGICAL HISTORY OF -       cervical disk herniation       Social History     Tobacco Use     Smoking status: Former Smoker     Last attempt to quit: 1987     Years since quittin.7     Smokeless tobacco: Never Used   Substance Use Topics     Alcohol use: Yes     Comment: couple times a week       Family History   Problem Relation Age of Onset     Hypertension Mother      Family History Negative Father        Prior to Admission medications   "  Medication Sig Start Date End Date Taking? Authorizing Provider   losartan (COZAAR) 50 MG tablet Take 1 tablet (50 mg) by mouth daily 5/21/19  Yes Aaseby-Aguilera, Ramona Ann, PA-C   cyclobenzaprine (FLEXERIL) 10 MG tablet Take 1 tablet (10 mg) by mouth 3 times daily as needed for muscle spasms 3/25/19   Feliz Guillen PA-C   hydrochlorothiazide (HYDRODIURIL) 12.5 MG tablet Take 1 tablet (12.5 mg) by mouth daily 5/21/19   Aaseby-Aguilera, Ramona Ann, PA-C   imiquimod (ALDARA) 5 % cream Apply a small sized amount to warts or molluscum three times weekly at bedtime.   Wash off after 8 hours.   May use for up to 16 weeks. 5/24/18   Aaseby-Aguilera, Ramona Ann, PA-C   losartan-hydrochlorothiazide (HYZAAR) 50-12.5 MG tablet Take 1 tablet by mouth daily 2/11/19   Aaseby-Aguilera, Ramona Ann, PA-C   triamcinolone (KENALOG) 0.1 % cream Apply sparingly to affected area three times daily for 14 days. 5/24/18   Aaseby-Aguilera, Ramona Ann, PA-C   valACYclovir (VALTREX) 500 MG tablet Take 1 tablet (500 mg) by mouth daily 12/11/17   Aaseby-Aguilera, Ramona Ann, PA-C       No Known Allergies     REVIEW OF SYSTEMS:   5 point ROS negative except as noted above in HPI, including Gen., Resp., CV, GI &  system review.    PHYSICAL EXAM:   There were no vitals taken for this visit. Estimated body mass index is 35.14 kg/m  as calculated from the following:    Height as of 5/2/19: 1.842 m (6' 0.5\").    Weight as of 5/2/19: 119.2 kg (262 lb 11.2 oz).   GENERAL APPEARANCE: alert, and oriented  MENTAL STATUS: alert  AIRWAY EXAM: Mallampatti Class I (visualization of the soft palate, fauces, uvula, anterior and posterior pillars)  RESP: lungs clear to auscultation - no rales, rhonchi or wheezes  CV: regular rates and rhythm  DIAGNOSTICS:    Not indicated    IMPRESSION   ASA Class 2 - Mild systemic disease    PLAN:   Plan for Colonoscopy with possible biopsy, possible polypectomy. We discussed the risks, benefits and alternatives and the " patient wished to proceed.    The above has been forwarded to the consulting provider.      Signed Electronically by: Quintin Alfredo  Linda 3, 2019

## 2019-06-03 NOTE — DISCHARGE INSTRUCTIONS
The patient has received a copy of the [facility]  report the doctor has written and discharge instructions have been discussed with the patient and responsible adult.  All questions were addressed and answered prior to patient discharge.      Understanding Colon and Rectal Polyps     The colon has a smooth lining composed of millions of cells.     The colon (also called the large intestine) is a muscular tube that forms the last part of the digestive tract. It absorbs water and stores food waste. The colon is about 4 to 6 feet long. The rectum is the last 6 inches of the colon. The colon and rectum have a smooth lining composed of millions of cells. Changes in these cells can lead to growths in the colon that can become cancerous and should be removed.     When the Colon Lining Changes  Changes that occur in the cells that line the colon or rectum can lead to growths called polyps. Over a period of years, polyps can turn cancerous. Removing polyps early may prevent cancer from ever forming.      Polyps  Polyps are fleshy clumps of tissue that form on the lining of the colon or rectum. Small polyps are usually benign (not cancerous). However, over time, cells in a polyp can change and become cancerous. The larger a polyp grows, the more likely this is to happen. Also, certain types of polyps known as adenomatous polyps are considered premalignant. This means that they will almost always become cancerous if they re not removed.          Cancer  Almost all colorectal cancers start when polyp cells begin growing abnormally. As a cancerous tumor grows, it may involve more and more of the colon or rectum. In time, cancer can also grow beyond the colon or rectum and spread to nearby organs or to glands called lymph nodes. The cells can also travel to other parts of the body. This is known as metastasis. The earlier a cancerous tumor is removed, the better the chance of preventing its spread.        8342-9737 Garry  [employer], [address] [place] [address]. All rights reserved. This information is not intended as a substitute for professional medical care. Always follow your healthcare professional's instructions.      Understanding Diverticulosis and Diverticulitis     Pouches or diverticula usually occur in the lower part of the colon called the sigmoid.      Diverticulitis occurs when the pouches become inflamed.     The colon (large intestine) is the last part of the digestive tract. It absorbs water from stool and changes it from a liquid to a solid. In certain cases, small pouches called diverticula can form in the colon wall. This condition is called diverticulosis. The pouches can become infected. If this happens, it becomes a more serious problem called diverticulitis. These problems can be painful. But they can be managed.   Managing Your Condition  Diet changes or taking medications are often tried first. These may be enough to bring relief. If the case is bad, surgery may be done. You and your doctor can discuss the plan that is best for you.  If You Have Diverticulosis  Diet changes are often enough to control symptoms. The main changes are adding fiber (roughage) and drinking more water. Fiber absorbs water as it travels through your colon. This helps your stool stay soft and move smoothly. Water helps this process. If needed, you may be told to take over-the-counter stool softeners. To help relieve pain, antispasmodic medications may be prescribed.  If You Have Diverticulitis  Treatment depends on how bad your symptoms are.  For mild symptoms: You may be put on a liquid diet for a short time. You may also be prescribed antibiotics. If these two steps relieve your symptoms, you may then be prescribed a high-fiber diet. If you still have symptoms, your doctor will discuss further treatment options with you.  For severe symptoms: You may need to be admitted to the hospital. There, you can be given IV  antibiotics and fluids. Once symptoms are under control, the above treatments may be tried. If these don t control your condition, your doctor may discuss the option of having surgery with you.  [place] [facility] Colon Health  Help keep your colon healthy with a diet that includes plenty of high-fiber fruits, vegetables, and whole grains. Drink plenty of liquids like water and juice. Your doctor may also recommend avoiding seeds and nuts.          [id number] [facility], [address] [place] [address], [place] [address]. All rights reserved. This information is not intended as a substitute for professional medical care. Always follow your healthcare professional's instructions.      Eating a High-Fiber Diet  Fiber is what gives strength and structure to plants. Most grains, beans, vegetables, and fruits contain fiber. Foods rich in fiber are often low in calories and fat, and they fill you up more. They may also reduce your risks for certain health problems. To find out the amount of fiber in canned, packaged, or frozen foods, read the  Nutrition Facts  label. It tells you how much fiber is in a serving.      Types of Fiber and Their Benefits  There are two types of fiber: insoluble and soluble. They both aid digestion and help you maintain a healthy weight.  Insoluble fiber: This is found in whole grains, cereals, certain fruits and vegetables (such as apple skin, corn, and carrots). Insoluble fiber may prevent constipation and reduce the risk of certain types of cancer.   Soluble fiber: This type of fiber is in oats, beans, and certain fruits and vegetables (such as strawberries and peas). Soluble fiber can reduce cholesterol (which may help lower the risk of heart disease), and helps control blood sugar levels.  Look for High-Fiber Foods  Whole-grain breads and cereals: Try to eat 6-8 ounces a day. Include wheat and oat bran cereals, whole-wheat muffins or toast, and corn tortillas in your meals.  Fruits: Try to eat 2 cups a  day. Apples, oranges, strawberries, pears, and bananas are good sources. (Note: Fruit juice is low in fiber.)  Vegetables: Try to eat 3 cups a day. Add asparagus, carrots, broccoli, peas, and corn to your meals.  Legumes (beans): One cup of cooked lentils gives you over 15 grams of fiber. Try navy beans, lentils, and chickpeas.  Seeds:  A small handful of seeds gives you about 3 grams of fiber. Try sunflower seeds.    Keep Track of Your Fiber  A healthy diet includes 31 grams of fiber a day if you have a 2,000-calorie diet. Keep track of how much fiber you eat. Start by reading food labels. Then eat a variety of foods high in fiber. Ask your doctor about supplemental fiber products.            8173-0267 [employer], [address] [place] [address]. All rights reserved. This information is not intended as a substitute for professional medical care. Always follow your healthcare professional's instructions.      HIGH FIBER DIET  Fiber is present in all fruits, vegetables, cereals and grains. Fiber passes through the body undigested. A high fiber diet helps food move through the intestinal tract. The added bulk is helpful in preventing constipation. In people with diverticulosis it serves to clean out the pouches along the colon wall while preventing new ones from forming. A high fiber diet also reduces the risk of colon cancer, decreases blood cholesterol and prevents high blood sugar in people with diabetes.    The foods listed below are high in fiber and should be included in your diet. If you are not used to high fiber foods, start with 1 or 2 foods from this list. Every 3-4 days add a new one to your diet until you are eating 4 high fiber foods per day. This should give you 20-35 Gm of fiber/day. It is also important to drink a lot of water when you are on this diet (6-8 glasses a day). Water causes the fiber to swell and increases the benefit.    FOODS HIGH IN DIETARY FIBER:  BREADS: Made with 100%  whole wheat flour; rober, wheat or rye crackers; tortillas, bran muffins  CEREALS: Whole grain cereal with bran (Chex, Raisin Bran, Corn Bran), oatmeal, rolled oats, granola, wheat flakes, brown rice  NUTS: Any nuts  FRUITS: All fresh fruits along with edible skins, (bananas, citrus fruit, mangoes, pears, prunes, raisins, apples, pineapple, apricot, melon, jams and marmalades), fruit juices (especially prune juice)  VEGETABLES: All types, preferably raw or lightly cooked: especially, celery, eggplant, potatoes, spinach, broccoli, brussel sprouts, winter squash, carrots, cauliflower, soybeans, lentils, fresh and dried beans of all kinds  OTHER: Popcorn, any spices      [id number] Garry [address], [place] [address]. All rights reserved. This information is not intended as a substitute for professional medical care. Always follow your healthcare professional's instructions.

## 2019-06-03 NOTE — LETTER
May 10, 2019      Dedrick Yi  [address] [facility] [address]         Thank you for choosing [facility]. You are scheduled for the following service:     Date:  Monday Linda 3, 2019             Procedure:  COLONOSCOPY  Doctor:        Dr Alfredo   Arrival Time:  2pm  *Check in at Emergency/Endoscopy desk*  Procedure Time:  230pm      Location:   [facility]        Endoscopy Department, First Floor (Enter through ER Doors) *        [address] [place], [place] [address]      [phone] or [phone] (On license of [facility]) to reschedule      MIRALAX -GATORADE  PREP  Colonoscopy is the most accurate test to detect colon polyps and colon cancer; and the only test where polyps can be removed. During this procedure, a doctor examines the lining of your large intestine and rectum through a flexible tube.   Transportation  Arrange for a ride for the day of your procedure with a responsible adult.  A taxi ride is not an option unless you are accompanied by a responsible adult. If you fail to arrange transportation with a responsible adult, your procedure will be cancelled and rescheduled.    Purchase the  following supplies at your local pharmacy:  - 2 (two) bisacodyl tablets: each tablet contains 5 mg.  (Dulcolax  laxative NOT Dulcolax  stool softener)   - 1 (one) 8.3 oz bottle of Polyethylene Glycol (PEG) 3350 Powder   (MiraLAX , Smooth LAX , ClearLAX  or equivalent)  - 64 oz Gatorade    Regular Gatorade, Gatorade G2 , Powerade , Powerade Zero  or Pedialyte  is acceptable. Red colored flavors are not allowed; all other colors (yellow, green, orange, purple and blue) are okay. It is also okay to buy two 2.12 oz packets of powdered Gatorade that can be mixed with water to a total volume of 64 oz of liquid.  - 1 (one) 10 oz bottle of Magnesium Citrate (Red colored flavors are not allowed)  It is also okay for you to use a 0.5 oz package of powdered magnesium citrate  (17 g) mixed with 10 oz of water.      PREPARATION FOR COLONOSCOPY    7 days before:    Discontinue fiber supplements and medications containing iron. This includes Metamucil  and Fibercon ; and multivitamins with iron.    3 days before:    Begin a low-fiber diet. A low-fiber diet helps making the cleanout more effective.     Examples of a low-fiber diet include (but are not limited to): white bread, white rice, pasta, crackers, fish, chicken, eggs, ground beef, creamy peanut butter, cooked/steamed/boiled vegetables, canned fruit, bananas, melons, milk, plain yogurt cheese, salad dressing and other condiments.     The following are not allowed on a low-fiber diet: seeds, nuts, popcorn, bran, whole wheat, corn, quinoa, raw fruits and vegetables, berries and dried fruit, beans and lentils.    For additional details on low-fiber diet, please refer to the table on the last page.    2 days before:    Continue the low-fiber diet.     Drink at least 8 glasses of water throughout the day.     Stop eating solid foods at 11:45 pm.    1 day before:    In the morning: begin a clear liquid diet (liquids you can see through).     Examples of a clear liquid diet include: water, clear broth or bouillon, Gatorade, Pedialyte or Powerade, carbonated and non-carbonated soft drinks (Sprite , 7-Up , ginger ale), strained fruit juices without pulp (apple, white grape, white cranberry), Jell-O  and popsicles.     The following are not allowed on a clear liquid diet: red liquids, alcoholic beverages, dairy products (milk, creamer, and yogurt), protein shakes, creamy broths, juice with pulp and chewing tobacco.    At noon: take 2 (two) bisacodyl tablets     At 4 (and no later than 6pm): start drinking the Miralax-Gatorade preparation (8.3 oz of Miralax mixed with 64 oz of Gatorade in a large pitcher). Drink 1(one) 8 oz glass every 15 minutes thereafter, until the mixture is gone.    COLON CLEANSING TIPS: drink adequate amounts of fluids  before and after your colon cleansing to prevent dehydration. Stay near a toilet because you will have diarrhea. Even if you are sitting on the toilet, continue to drink the cleansing solution every 15 minutes. If you feel nauseous or vomit, rinse your mouth with water, take a 15 to 30-minute-break and then continue drinking the solution. You will be uncomfortable until the stool has flushed from your colon (in about 2 to 4 hours). You may feel chilled.    Day of your procedure  You may take all of your morning medications including blood pressure medications, blood thinners (if you have not been instructed to stop these by our office), methadone, anti-seizure medications with sips of water 3 hours prior to your procedure or earlier. Do not take insulin or vitamins prior to your procedure. Continue the clear liquid diet.       4 hours prior: drink 10 oz of magnesium citrate. It may be easier to drink it with a straw.    STOP consuming all liquids after that.     Do not take anything by mouth during this time.     Allow extra time to travel to your procedure as you may need to stop and use a restroom along the way.    You are ready for the procedure, if you followed all instructions and your stool is no longer formed, but clear or yellow liquid. If you are unsure whether your colon is clean, please call our office at [phone] before you leave for your appointment.    Bring the following to your procedure:  - Insurance Card/Photo ID.   - List of current medications including over-the-counter medications and supplements.   - Your rescue inhaler if you currently use one to control asthma.      Canceling or rescheduling your appointment:   If you must cancel or reschedule your appointment, please call [phone] as soon as possible.      COLONOSCOPY PRE-PROCEDURE CHECKLIST    If you have diabetes, ask your regular doctor for diet and medication restrictions.  If you take an anticoagulant or anti-platelet medication  (such as Coumadin , Lovenox , Pradaxa , Xarelto , Eliquis , etc.), please call your primary doctor for advice on holding this medication.  If you take aspirin you may continue to do so.  If you are or may be pregnant, please discuss the risks and benefits of this procedure with your doctor.        What happens during a colonoscopy?    Plan to spend up to two hours, starting at registration time, at the endoscopy center the day of your procedure. The colonoscopy takes an average of 15 to 30 minutes. Recovery time is about 30 minutes.      Before the exam:    You will change into a gown.    Your medical history and medication list will be reviewed with you, unless that has been done over the phone prior to the procedure.     A nurse will insert an intravenous (IV) line into your hand or arm.    The doctor will meet with you and will give you a consent form to sign.  During the exam:     Medicine will be given through the IV line to help you relax.     Your heart rate and oxygen levels will be monitored. If your blood pressure is low, you may be given fluids through the IV line.     The doctor will insert a flexible hollow tube, called a colonoscope, into your rectum. The scope will be advanced slowly through the large intestine (colon).    You may have a feeling of fullness or pressure.     If an abnormal tissue or a polyp is found, the doctor may remove it through the endoscope for closer examination, or biopsy. Tissue removal is painless    After the exam:           Any tissue samples removed during the exam will be sent to a lab for evaluation. It may take 5-7 working days for you to be notified of the results.     A nurse will provide you with complete discharge instructions before you leave the endoscopy center. Be sure to ask the nurse for specific instructions if you take blood thinners such as Aspirin, Coumadin or Plavix.     The doctor will prepare a full report for you and for the physician who referred you  for the procedure.     Your doctor will talk with you about the initial results of your exam.      Medication given during the exam will prohibit you from driving for the rest of the day.     Following the exam, you may resume your normal diet. Your first meal should be light, no greasy foods. Avoid alcohol until the next day.     You may resume your regular activities the day after the procedure.         LOW-FIBER DIET    Foods RECOMMENDED Foods to AVOID   Breads, Cereal, Rice and Pasta:   White bread, rolls, biscuits, croissant and adriano toast.   Waffles, German toast and pancakes.   White rice, noodles, pasta, macaroni and peeled cooked potatoes.   Plain crackers and saltines.   Cooked cereals: farina, cream of rice.   Cold cereals: Puffed Rice , Rice Krispies , Corn Flakes  and Special K    Breads, Cereal, Rice and Pasta:   Breads or rolls with nuts, seeds or fruit.   Whole wheat, pumpernickel, rye breads and cornbread.   Potatoes with skin, brown or wild rice, and kasha (buckwheat).     Vegetables:   Tender cooked and canned vegetables without seeds: carrots, asparagus tips, green or wax beans, pumpkin, spinach, lima beans. Vegetables:   Raw or steamed vegetables.   Vegetables with seeds.   Sauerkraut.   Winter squash, peas, broccoli, Brussel sprouts, cabbage, onions, cauliflower, baked beans, peas and corn.   Fruits:   Strained fruit juice.   Canned fruit, except pineapple.   Ripe bananas and melon. Fruits:   Prunes and prune juice.   Raw fruits.   Dried fruits: figs, dates and raisins.   Milk/Dairy:   Milk: plain or flavored.   Yogurt, custard and ice cream.   Cheese and cottage cheese Milk/Dairy:     Meat and other proteins:   ground, well-cooked tender beef, lamb, ham, veal, pork, fish, poultry and organ meats.   Eggs.   Peanut butter without nuts. Meat and other proteins:   Tough, fibrous meats with gristle.   Dry beans, peas and lentils.   Peanut butter with nuts.   Tofu.   Fats, Snack, Sweets, Condiments  and Beverages:   Margarine, butter, oils, mayonnaise, sour cream and salad dressing, plain gravy.   Sugar, hard candy, clear jelly, honey and syrup.   Spices, cooked herbs, bouillon, broth and soups made with allowed vegetable, ketchup and mustard.   Coffee, tea and carbonated drinks.   Plain cakes, cookies and pretzels.   Gelatin, plain puddings, custard, ice cream, sherbet and popsicles. Fats, Snack, Sweets, Condiments and Beverages:   Nuts, seeds and coconut.   Jam, marmalade and preserves.   Pickles, olives, relish and horseradish.   All desserts containing nuts, seeds, dried fruit and coconut; or made from whole grains or bran.   Candy made with nuts or seeds.   Popcorn.                     DIRECTIONS TO THE ENDOSCOPY DEPARTMENT     From the north ([facility])  Take [address] South, exit on [address] [address]. Get into the left hand reese, turn left (east), go one-half mile to [address] and turn left. Go north to the first stoplight, take a right on [address] and follow it to the Emergency entrance.    From the south ([facility] [place] [facility])  Take [address]N to the [address]E split and exit on [address] [address]. On [address] [address], turn left (west) to [address]. Turn right (north) on [address]. Go north to the first stoplight, take a right on [address] and follow it to the Emergency entrance.    From the east via 35E ([facility])  Take [address] south to [address] [address] exit. Turn right on [address] . Go west to [address]. Turn right (north) on [address]. Go to the first stoplight, take a right and follow on [address] to the Emergency entrance.    From the east via [address] ([facility])  Take [address] to [address]. Turn left (south) on [address] to [address]. Turn left (east) on [address] and follow it to the Emergency entrance.    From the west via [address] ([place], [place])  Take [address] east to [address]. Turn right  (south) on [address] to [address]. Turn left (east) on [address] and follow it to the Emergency entrance.

## 2019-06-04 LAB — COPATH REPORT: NORMAL

## 2019-08-07 ENCOUNTER — TELEPHONE (OUTPATIENT)
Dept: FAMILY MEDICINE | Facility: CLINIC | Age: 64
End: 2019-08-07

## 2019-08-07 NOTE — TELEPHONE ENCOUNTER
Panel Management Review      Patient has the following on his problem list:   Hypertension   Last three blood pressure readings:  BP Readings from Last 3 Encounters:   06/03/19 (!) 143/91   05/02/19 122/82   03/25/19 160/87     Blood pressure: FAILED    HTN Guidelines:  Less than 140/90      Composite cancer screening  Chart review shows that this patient is due/due soon for the following None  Summary:    Patient is due/failing the following:   BP CHECK    Action needed:   Patient needs nurse only appointment.    Type of outreach:    Phone, spoke to patient.  got him a nurse only BP check scheduled.     Questions for provider review:    None                                                                                                                                    Saravanan Yi [facility]           Chart routed to none .

## 2019-08-09 ENCOUNTER — TELEPHONE (OUTPATIENT)
Dept: FAMILY MEDICINE | Facility: CLINIC | Age: 64
End: 2019-08-09

## 2019-08-09 ENCOUNTER — ALLIED HEALTH/NURSE VISIT (OUTPATIENT)
Dept: NURSING | Facility: CLINIC | Age: 64
End: 2019-08-09
Payer: COMMERCIAL

## 2019-08-09 VITALS — OXYGEN SATURATION: 98 % | SYSTOLIC BLOOD PRESSURE: 134 MMHG | HEART RATE: 70 BPM | DIASTOLIC BLOOD PRESSURE: 80 MMHG

## 2019-08-09 DIAGNOSIS — Z01.30 BP CHECK: Primary | ICD-10-CM

## 2019-08-09 NOTE — TELEPHONE ENCOUNTER
Vital Signs 6/3/2019 6/3/2019 6/3/2019 6/3/2019 6/3/2019   Systolic 157 153 147 143    Diastolic 101 95 92 91    Pulse 68  71 69    Temperature        Respirations 20 16 16 16 16   Weight (LB)        Height        BMI (Calculated)        O2 93 95 95 93 92     Vital Signs 8/9/2019   Systolic 134   Diastolic 80   Pulse 70   Temperature    Respirations    Weight (LB)    Height    BMI (Calculated)    O2 98     Pt sat for 12 minutes and took on right arm at 1010am on 8/9/2019, NO call is needed unless results need to be reviewed by providor.Jc Joy CMA

## 2019-09-05 ENCOUNTER — TELEPHONE (OUTPATIENT)
Dept: FAMILY MEDICINE | Facility: CLINIC | Age: 64
End: 2019-09-05

## 2019-09-05 DIAGNOSIS — R35.0 URINARY FREQUENCY: Primary | ICD-10-CM

## 2019-09-05 NOTE — TELEPHONE ENCOUNTER
Patient states [facility] setup a appointment to have his Prostate checked but that facility cancelled the appointment on him. He now would like to get this setup up again and is unsure if he needs a new order from [facility]. Back in 2017 she placed a referral for Urology, but is unsure if that is what patient is talking about.    Alana Jolley

## 2019-09-05 NOTE — TELEPHONE ENCOUNTER
PCP:    Urology referral was placed back in December 2017 for urinary frequency. The Pt did schedule an appointment in December 2017, however he reports he was called to cancel due to the provider being out that day. He reports he never called back to reschedule.     He would like a new referral place for Urology. He reports getting up to urinate about 4-5 times in the overnight hours.     Ok for new referral?    Teagan Gordon RN -- [facility]

## 2019-09-05 NOTE — TELEPHONE ENCOUNTER
Approved.  [facility] [phone]   Please let him know

## 2019-09-29 ENCOUNTER — HEALTH MAINTENANCE LETTER (OUTPATIENT)
Age: 64
End: 2019-09-29

## 2019-10-29 DIAGNOSIS — R35.0 URINARY FREQUENCY: Primary | ICD-10-CM

## 2019-10-30 ENCOUNTER — OFFICE VISIT (OUTPATIENT)
Dept: UROLOGY | Facility: CLINIC | Age: 64
End: 2019-10-30
Attending: PHYSICIAN ASSISTANT
Payer: COMMERCIAL

## 2019-10-30 VITALS — HEART RATE: 78 BPM | BODY MASS INDEX: 31.08 KG/M2 | HEIGHT: 75 IN | WEIGHT: 250 LBS

## 2019-10-30 DIAGNOSIS — R35.1 NOCTURIA: ICD-10-CM

## 2019-10-30 DIAGNOSIS — N40.0 ENLARGED PROSTATE: Primary | ICD-10-CM

## 2019-10-30 DIAGNOSIS — R35.0 URINARY FREQUENCY: ICD-10-CM

## 2019-10-30 LAB
ALBUMIN UR-MCNC: NEGATIVE MG/DL
APPEARANCE UR: CLEAR
BILIRUB UR QL STRIP: NEGATIVE
COLOR UR AUTO: YELLOW
GLUCOSE UR STRIP-MCNC: NEGATIVE MG/DL
HGB UR QL STRIP: NEGATIVE
KETONES UR STRIP-MCNC: NEGATIVE MG/DL
LEUKOCYTE ESTERASE UR QL STRIP: NEGATIVE
NITRATE UR QL: NEGATIVE
PH UR STRIP: 7 PH (ref 5–7)
RESIDUAL VOLUME (RV) (EXTERNAL): 23
SOURCE: NORMAL
SP GR UR STRIP: 1.02 (ref 1–1.03)
UROBILINOGEN UR STRIP-ACNC: 1 EU/DL (ref 0.2–1)

## 2019-10-30 PROCEDURE — 51798 US URINE CAPACITY MEASURE: CPT | Performed by: UROLOGY

## 2019-10-30 PROCEDURE — 99204 OFFICE O/P NEW MOD 45 MIN: CPT | Mod: 25 | Performed by: UROLOGY

## 2019-10-30 PROCEDURE — 81003 URINALYSIS AUTO W/O SCOPE: CPT | Mod: QW | Performed by: UROLOGY

## 2019-10-30 RX ORDER — LATANOPROST 50 UG/ML
1 SOLUTION/ DROPS OPHTHALMIC
COMMUNITY
Start: 2019-08-12

## 2019-10-30 RX ORDER — TAMSULOSIN HYDROCHLORIDE 0.4 MG/1
0.4 CAPSULE ORAL DAILY
Qty: 30 CAPSULE | Refills: 11 | Status: SHIPPED | OUTPATIENT
Start: 2019-10-30 | End: 2020-10-29

## 2019-10-30 ASSESSMENT — PAIN SCALES - GENERAL: PAINLEVEL: NO PAIN (0)

## 2019-10-30 ASSESSMENT — MIFFLIN-ST. JEOR: SCORE: 2004.62

## 2019-10-30 NOTE — LETTER
10/30/2019       RE: Dedrick Yi  [address]  [place] [address]     Dear Colleague,    Thank you for referring your patient, Dedrick Yi, to the [facility] [place] at [facility]. Please see a copy of my visit note below.    [facility]  Main Office: [address]  [address]  [place] [address]       CHIEF COMPLAINT:  Urinary frequency, nocturia    HISTORY:   I was asked by Ramona Aaseby-Aguilar to see this 64-year-old gentleman who complains of urinary frequency and nocturia.  His main complaint is that he gets up 4 times a night to urinate.  He says that sometimes when he gets up with a strong urge to urinate at night he does not actually void much.  This happens occasionally during the day as well.  He reports a normal urinary stream.  No history of gross hematuria or infections.  He says that his father had some form of surgery for an enlarged prostate, possibly a TURP.  He has no family history of prostate cancer.  He has not had a PSA checked since 2017.      PAST MEDICAL HISTORY:   Past Medical History:   Diagnosis Date     Anal warts 2015     Colon polyp      Hypertension        PAST SURGICAL HISTORY:   Past Surgical History:   Procedure Laterality Date     AS LAP,INGUINAL HERNIA REPR,INITIAL      right     COLONOSCOPY       HC TOOTH EXTRACTION W/FORCEP       SURGICAL HISTORY OF -       cervical disk herniation       FAMILY HISTORY:   Family History   Problem Relation Age of Onset     Hypertension Mother      Family History Negative Father        SOCIAL HISTORY:   Social History     Tobacco Use     Smoking status: Former Smoker     Last attempt to quit: 1987     Years since quittin.2     Smokeless tobacco: Never Used   Substance Use Topics     Alcohol use: Yes     Comment: couple times a week        No Known Allergies      Current Outpatient Medications:      losartan (COZAAR) 50 MG  tablet, Take 1 tablet (50 mg) by mouth daily, Disp: 90 tablet, Rfl: 0    Review Of Systems:  Skin: No rash, pruritis, or skin pigmentation  Eyes: No changes in vision  Ears/Nose/Throat: No changes in hearing, no nosebleeds  Respiratory: No shortness of breath, dyspnea on exertion, cough, or hemoptysis  Cardiovascular: No chest pain or palpitations  Gastrointestinal: No diarrhea or constipation. No abdominal pain. No hematochezia  Genitourinary: see HPI  Musculoskeletal: No pain or swelling of joints, normal range of motion  Neurologic: No weakness or tremors  Psychiatric: No recent changes in memory or mood  Hematologic/Lymphatic/Immunologic: No easy bruising or enlarged lymph nodes  Endocrine: No weight gain or loss      PHYSICAL EXAM:    There were no vitals taken for this visit.  General appearance: In NAD, conversant  HEENT: Normocephalic and atraumatic, anicteric sclera  Cardiovascular: Not examined  Respiratory: normal, non-labored breathing  Gastrointestinal: negative, Abdomen soft, non-tender, and non-distended.   Musculoskeletal: Not Examined  Peripheral Vascular/extremity: No peripheral edema  Skin: Normal temperature, turgor, and texture. No rash  Psychiatric: Appropriate affect, alert and oriented to person, place, and time    Penis: Normal  Scrotal skin: Normal, no lesions  Testicles: Normal to palpation bilaterally  Epididymis: Normal to palpation bilaterally  Lymphatic: Normal inguinal lymph nodes    Digital Rectal Exam: The prostate is slightly enlarged, benign and symmetric to palpation    Cystoscopy: Not done      PSA: Last PSA was 0.42 in 2017    UA RESULTS:  Recent Labs   Lab Test 05/02/19  0926   COLOR Yellow   APPEARANCE Clear   URINEGLC Negative   URINEBILI Negative   URINEKETONE Negative   SG 1.015   UBLD Negative   URINEPH 7.0   PROTEIN Negative   UROBILINOGEN 0.2   NITRITE Negative   LEUKEST Negative       Bladder Scan: 23mL    Other Labs:      Imaging Studies: None      CLINICAL  IMPRESSION:   Urinary frequency and nocturia    PLAN:   We discussed potential etiologies for his urinary frequency and nocturia.  The prostate only feels slightly enlarged on examination today.  We discussed conservative measures for reducing nocturia.  We discussed medical and surgical treatment options for enlarged prostate.  I think that a trial of Flomax will be warranted here to see if it improves his symptoms.  I prescribed him Flomax daily and gave him instructions.  I will see him back in 1 month to see if the medication is helping.  If the medication is not helping his symptoms we could always proceed with a cystoscopy at his next visit as well.  I will check an updated PSA test before his next visit.      Grant Stone MD

## 2019-10-30 NOTE — PROGRESS NOTES
[facility]  Main Office: [address]  [address]  [place] [address]       CHIEF COMPLAINT:  Urinary frequency, nocturia    HISTORY:   I was asked by Ramona Aaseby-Aguilar to see this 64-year-old gentleman who complains of urinary frequency and nocturia.  His main complaint is that he gets up 4 times a night to urinate.  He says that sometimes when he gets up with a strong urge to urinate at night he does not actually void much.  This happens occasionally during the day as well.  He reports a normal urinary stream.  No history of gross hematuria or infections.  He says that his father had some form of surgery for an enlarged prostate, possibly a TURP.  He has no family history of prostate cancer.  He has not had a PSA checked since 2017.      PAST MEDICAL HISTORY:   Past Medical History:   Diagnosis Date     Anal warts 2015     Colon polyp      Hypertension        PAST SURGICAL HISTORY:   Past Surgical History:   Procedure Laterality Date     AS LAP,INGUINAL HERNIA REPR,INITIAL      right     COLONOSCOPY       HC TOOTH EXTRACTION W/FORCEP       SURGICAL HISTORY OF -       cervical disk herniation       FAMILY HISTORY:   Family History   Problem Relation Age of Onset     Hypertension Mother      Family History Negative Father        SOCIAL HISTORY:   Social History     Tobacco Use     Smoking status: Former Smoker     Last attempt to quit: 1987     Years since quittin.2     Smokeless tobacco: Never Used   Substance Use Topics     Alcohol use: Yes     Comment: couple times a week        No Known Allergies      Current Outpatient Medications:      losartan (COZAAR) 50 MG tablet, Take 1 tablet (50 mg) by mouth daily, Disp: 90 tablet, Rfl: 0    Review Of Systems:  Skin: No rash, pruritis, or skin pigmentation  Eyes: No changes in vision  Ears/Nose/Throat: No changes in hearing, no nosebleeds  Respiratory: No shortness of breath, dyspnea on exertion, cough, or hemoptysis  Cardiovascular: No  chest pain or palpitations  Gastrointestinal: No diarrhea or constipation. No abdominal pain. No hematochezia  Genitourinary: see HPI  Musculoskeletal: No pain or swelling of joints, normal range of motion  Neurologic: No weakness or tremors  Psychiatric: No recent changes in memory or mood  Hematologic/Lymphatic/Immunologic: No easy bruising or enlarged lymph nodes  Endocrine: No weight gain or loss      PHYSICAL EXAM:    There were no vitals taken for this visit.  General appearance: In NAD, conversant  HEENT: Normocephalic and atraumatic, anicteric sclera  Cardiovascular: Not examined  Respiratory: normal, non-labored breathing  Gastrointestinal: negative, Abdomen soft, non-tender, and non-distended.   Musculoskeletal: Not Examined  Peripheral Vascular/extremity: No peripheral edema  Skin: Normal temperature, turgor, and texture. No rash  Psychiatric: Appropriate affect, alert and oriented to person, place, and time    Penis: Normal  Scrotal skin: Normal, no lesions  Testicles: Normal to palpation bilaterally  Epididymis: Normal to palpation bilaterally  Lymphatic: Normal inguinal lymph nodes    Digital Rectal Exam: The prostate is slightly enlarged, benign and symmetric to palpation    Cystoscopy: Not done      PSA: Last PSA was 0.42 in 2017    UA RESULTS:  Recent Labs   Lab Test 05/02/19  0926   COLOR Yellow   APPEARANCE Clear   URINEGLC Negative   URINEBILI Negative   URINEKETONE Negative   SG 1.015   UBLD Negative   URINEPH 7.0   PROTEIN Negative   UROBILINOGEN 0.2   NITRITE Negative   LEUKEST Negative       Bladder Scan: 23mL    Other Labs:      Imaging Studies: None      CLINICAL IMPRESSION:   Urinary frequency and nocturia    PLAN:   We discussed potential etiologies for his urinary frequency and nocturia.  The prostate only feels slightly enlarged on examination today.  We discussed conservative measures for reducing nocturia.  We discussed medical and surgical treatment options for enlarged prostate.  I  think that a trial of Flomax will be warranted here to see if it improves his symptoms.  I prescribed him Flomax daily and gave him instructions.  I will see him back in 1 month to see if the medication is helping.  If the medication is not helping his symptoms we could always proceed with a cystoscopy at his next visit as well.  I will check an updated PSA test before his next visit.      Grant Stone MD

## 2019-11-22 DIAGNOSIS — N40.0 ENLARGED PROSTATE: ICD-10-CM

## 2019-11-22 LAB — PSA SERPL-MCNC: 0.42 UG/L (ref 0–4)

## 2019-11-22 PROCEDURE — 84153 ASSAY OF PSA TOTAL: CPT | Performed by: UROLOGY

## 2019-11-22 PROCEDURE — 36415 COLL VENOUS BLD VENIPUNCTURE: CPT | Performed by: UROLOGY

## 2019-12-03 DIAGNOSIS — R35.1 NOCTURIA: Primary | ICD-10-CM

## 2019-12-04 ENCOUNTER — OFFICE VISIT (OUTPATIENT)
Dept: UROLOGY | Facility: CLINIC | Age: 64
End: 2019-12-04
Payer: COMMERCIAL

## 2019-12-04 VITALS
DIASTOLIC BLOOD PRESSURE: 90 MMHG | OXYGEN SATURATION: 96 % | BODY MASS INDEX: 32.08 KG/M2 | HEIGHT: 74 IN | SYSTOLIC BLOOD PRESSURE: 150 MMHG | HEART RATE: 73 BPM | WEIGHT: 250 LBS

## 2019-12-04 DIAGNOSIS — R35.1 NOCTURIA: ICD-10-CM

## 2019-12-04 DIAGNOSIS — N40.0 ENLARGED PROSTATE: Primary | ICD-10-CM

## 2019-12-04 LAB
ALBUMIN UR-MCNC: NEGATIVE MG/DL
APPEARANCE UR: CLEAR
BILIRUB UR QL STRIP: NEGATIVE
COLOR UR AUTO: YELLOW
GLUCOSE UR STRIP-MCNC: NEGATIVE MG/DL
HGB UR QL STRIP: NEGATIVE
KETONES UR STRIP-MCNC: NEGATIVE MG/DL
LEUKOCYTE ESTERASE UR QL STRIP: NEGATIVE
NITRATE UR QL: NEGATIVE
PH UR STRIP: 6.5 PH (ref 5–7)
SOURCE: NORMAL
SP GR UR STRIP: 1.02 (ref 1–1.03)
UROBILINOGEN UR STRIP-ACNC: 1 EU/DL (ref 0.2–1)

## 2019-12-04 PROCEDURE — 99213 OFFICE O/P EST LOW 20 MIN: CPT | Performed by: UROLOGY

## 2019-12-04 PROCEDURE — 81003 URINALYSIS AUTO W/O SCOPE: CPT | Mod: QW | Performed by: UROLOGY

## 2019-12-04 RX ORDER — TAMSULOSIN HYDROCHLORIDE 0.4 MG/1
0.4 CAPSULE ORAL DAILY
Qty: 90 CAPSULE | Refills: 3 | Status: SHIPPED | OUTPATIENT
Start: 2019-12-04 | End: 2020-04-14

## 2019-12-04 ASSESSMENT — MIFFLIN-ST. JEOR: SCORE: 1988.74

## 2019-12-04 ASSESSMENT — PAIN SCALES - GENERAL: PAINLEVEL: NO PAIN (0)

## 2019-12-04 NOTE — PROGRESS NOTES
Office Visit Note  [facility]  [phone]    UROLOGIC DIAGNOSES:   Urinary frequency, nocturia    CURRENT INTERVENTIONS:   Trial of Flomax    HISTORY:   Ed returns to clinic today after a one-month trial of Flomax for his urinary frequency and nocturia. His PSA is low at 0.42. he reports a good improvement on the Flomax.  He says that he only has nocturia once at night and has no urgency during the day.  He is very happy with the medication.      PAST MEDICAL HISTORY:   Past Medical History:   Diagnosis Date     Anal warts 2015     Colon polyp      Hypertension      Mumps        PAST SURGICAL HISTORY:   Past Surgical History:   Procedure Laterality Date     AS LAP,INGUINAL HERNIA REPR,INITIAL      right     COLONOSCOPY       HC TOOTH EXTRACTION W/FORCEP       SURGICAL HISTORY OF -       cervical disk herniation       FAMILY HISTORY:   Family History   Problem Relation Age of Onset     Hypertension Mother      Family History Negative Father        SOCIAL HISTORY:   Social History     Tobacco Use     Smoking status: Former Smoker     Packs/day: 0.00     Last attempt to quit: 1987     Years since quittin.3     Smokeless tobacco: Never Used   Substance Use Topics     Alcohol use: Yes     Comment: couple times a week       Current Outpatient Medications   Medication     latanoprost (XALATAN) 0.005 % ophthalmic solution     losartan (COZAAR) 50 MG tablet     tamsulosin (FLOMAX) 0.4 MG capsule     No current facility-administered medications for this visit.          PHYSICAL EXAM:    There were no vitals taken for this visit.    Constitutional: Well developed. Conversant and in no acute distress  Eyes: Anicteric sclera, conjunctiva clear, normal extraocular movements  ENT: Normocephalic and atraumatic,   Skin: Warm and dry. No rashes or lesions  Cardiac: No peripheral edema  Back/Flank: Not done  CNS/PNS: Normal musculature and movements, moves all extremities normally  Respiratory: Normal  non-labored breathing  Abdomen: Soft nontender and nondistended  Peripheral Vascular: No peripheral edema  Mental Status/Psych: Alert and Oriented x 3. Normal mood and affect    Penis: Not done  Scrotal Skin: Not done  Testicles: Not done  Epididymis: Not done  Digital Rectal Exam:     Cystoscopy: Not done    Imaging: None    Urinalysis: UA RESULTS:  Recent Labs   Lab Test 10/30/19  1100   COLOR Yellow   APPEARANCE Clear   URINEGLC Negative   URINEBILI Negative   URINEKETONE Negative   SG 1.020   UBLD Negative   URINEPH 7.0   PROTEIN Negative   UROBILINOGEN 1.0   NITRITE Negative   LEUKEST Negative       PSA: 0.42    Post Void Residual:     Other labs: None today      IMPRESSION:  Doing well    PLAN:  He has had a good effect from the Flomax.  He will continue the medication.  I will plan to see him back again in 1 year to recheck a PSA and to check up on his symptoms.  He is to follow-up with me sooner if any symptoms should recur in the interim.      Grant Stone M.D.

## 2019-12-04 NOTE — LETTER
2019       RE: Dedrick Yi  [address] [place] [address]     Dear Colleague,    Thank you for referring your patient, Dedrick Yi, to the [facility] [place] at [facility]. Please see a copy of my visit note below.    Office Visit Note  [facility]  [phone]    UROLOGIC DIAGNOSES:   Urinary frequency, nocturia    CURRENT INTERVENTIONS:   Trial of Flomax    HISTORY:   Ed returns to clinic today after a one-month trial of Flomax for his urinary frequency and nocturia. His PSA is low at 0.42. he reports a good improvement on the Flomax.  He says that he only has nocturia once at night and has no urgency during the day.  He is very happy with the medication.      PAST MEDICAL HISTORY:   Past Medical History:   Diagnosis Date     Anal warts 2015     Colon polyp      Hypertension      Mumps        PAST SURGICAL HISTORY:   Past Surgical History:   Procedure Laterality Date     AS LAP,INGUINAL HERNIA REPR,INITIAL      right     COLONOSCOPY       HC TOOTH EXTRACTION W/FORCEP       SURGICAL HISTORY OF -       cervical disk herniation       FAMILY HISTORY:   Family History   Problem Relation Age of Onset     Hypertension Mother      Family History Negative Father        SOCIAL HISTORY:   Social History     Tobacco Use     Smoking status: Former Smoker     Packs/day: 0.00     Last attempt to quit: 1987     Years since quittin.3     Smokeless tobacco: Never Used   Substance Use Topics     Alcohol use: Yes     Comment: couple times a week       Current Outpatient Medications   Medication     latanoprost (XALATAN) 0.005 % ophthalmic solution     losartan (COZAAR) 50 MG tablet     tamsulosin (FLOMAX) 0.4 MG capsule     No current facility-administered medications for this visit.          PHYSICAL EXAM:    There were no vitals taken for this visit.    Constitutional: Well developed. Conversant and in no  acute distress  Eyes: Anicteric sclera, conjunctiva clear, normal extraocular movements  ENT: Normocephalic and atraumatic,   Skin: Warm and dry. No rashes or lesions  Cardiac: No peripheral edema  Back/Flank: Not done  CNS/PNS: Normal musculature and movements, moves all extremities normally  Respiratory: Normal non-labored breathing  Abdomen: Soft nontender and nondistended  Peripheral Vascular: No peripheral edema  Mental Status/Psych: Alert and Oriented x 3. Normal mood and affect    Penis: Not done  Scrotal Skin: Not done  Testicles: Not done  Epididymis: Not done  Digital Rectal Exam:     Cystoscopy: Not done    Imaging: None    Urinalysis: UA RESULTS:  Recent Labs   Lab Test 10/30/19  1100   COLOR Yellow   APPEARANCE Clear   URINEGLC Negative   URINEBILI Negative   URINEKETONE Negative   SG 1.020   UBLD Negative   URINEPH 7.0   PROTEIN Negative   UROBILINOGEN 1.0   NITRITE Negative   LEUKEST Negative       PSA: 0.42    Post Void Residual:     Other labs: None today      IMPRESSION:  Doing well    PLAN:  He has had a good effect from the Flomax.  He will continue the medication.  I will plan to see him back again in 1 year to recheck a PSA and to check up on his symptoms.  He is to follow-up with me sooner if any symptoms should recur in the interim.      Grant Stone M.D.

## 2020-03-15 ENCOUNTER — HEALTH MAINTENANCE LETTER (OUTPATIENT)
Age: 65
End: 2020-03-15

## 2020-03-19 ENCOUNTER — TELEPHONE (OUTPATIENT)
Dept: FAMILY MEDICINE | Facility: CLINIC | Age: 65
End: 2020-03-19

## 2020-03-30 ENCOUNTER — NURSE TRIAGE (OUTPATIENT)
Dept: FAMILY MEDICINE | Facility: CLINIC | Age: 65
End: 2020-03-30

## 2020-03-30 ENCOUNTER — OFFICE VISIT (OUTPATIENT)
Dept: FAMILY MEDICINE | Facility: CLINIC | Age: 65
End: 2020-03-30
Payer: COMMERCIAL

## 2020-03-30 VITALS
OXYGEN SATURATION: 93 % | BODY MASS INDEX: 34.7 KG/M2 | HEIGHT: 74 IN | HEART RATE: 79 BPM | WEIGHT: 270.4 LBS | DIASTOLIC BLOOD PRESSURE: 80 MMHG | SYSTOLIC BLOOD PRESSURE: 139 MMHG | TEMPERATURE: 99 F

## 2020-03-30 DIAGNOSIS — R73.01 IMPAIRED FASTING GLUCOSE: Primary | ICD-10-CM

## 2020-03-30 DIAGNOSIS — K57.30 DIVERTICULOSIS OF LARGE INTESTINE WITHOUT HEMORRHAGE: ICD-10-CM

## 2020-03-30 DIAGNOSIS — I10 HYPERTENSION GOAL BP (BLOOD PRESSURE) < 140/90: ICD-10-CM

## 2020-03-30 LAB
ANION GAP SERPL CALCULATED.3IONS-SCNC: 4 MMOL/L (ref 3–14)
BUN SERPL-MCNC: 15 MG/DL (ref 7–30)
CALCIUM SERPL-MCNC: 9.4 MG/DL (ref 8.5–10.1)
CHLORIDE SERPL-SCNC: 104 MMOL/L (ref 94–109)
CO2 SERPL-SCNC: 29 MMOL/L (ref 20–32)
CREAT SERPL-MCNC: 0.95 MG/DL (ref 0.66–1.25)
GFR SERPL CREATININE-BSD FRML MDRD: 83 ML/MIN/{1.73_M2}
GLUCOSE SERPL-MCNC: 106 MG/DL (ref 70–99)
HBA1C MFR BLD: 6.1 % (ref 0–5.6)
POTASSIUM SERPL-SCNC: 3.8 MMOL/L (ref 3.4–5.3)
SODIUM SERPL-SCNC: 137 MMOL/L (ref 133–144)

## 2020-03-30 PROCEDURE — 99214 OFFICE O/P EST MOD 30 MIN: CPT | Performed by: PHYSICIAN ASSISTANT

## 2020-03-30 PROCEDURE — 80048 BASIC METABOLIC PNL TOTAL CA: CPT | Performed by: PHYSICIAN ASSISTANT

## 2020-03-30 PROCEDURE — 83036 HEMOGLOBIN GLYCOSYLATED A1C: CPT | Performed by: PHYSICIAN ASSISTANT

## 2020-03-30 PROCEDURE — 36415 COLL VENOUS BLD VENIPUNCTURE: CPT | Performed by: PHYSICIAN ASSISTANT

## 2020-03-30 RX ORDER — SULFAMETHOXAZOLE/TRIMETHOPRIM 800-160 MG
1 TABLET ORAL 2 TIMES DAILY
Qty: 20 TABLET | Refills: 0 | Status: SHIPPED | OUTPATIENT
Start: 2020-03-30 | End: 2020-04-14

## 2020-03-30 RX ORDER — LOSARTAN POTASSIUM 50 MG/1
50 TABLET ORAL DAILY
Qty: 90 TABLET | Refills: 1 | Status: SHIPPED | OUTPATIENT
Start: 2020-03-30 | End: 2020-08-26

## 2020-03-30 RX ORDER — METRONIDAZOLE 500 MG/1
500 TABLET ORAL 2 TIMES DAILY
Qty: 21 TABLET | Refills: 0 | Status: SHIPPED | OUTPATIENT
Start: 2020-03-30 | End: 2020-04-14

## 2020-03-30 ASSESSMENT — MIFFLIN-ST. JEOR: SCORE: 2081.28

## 2020-03-30 NOTE — PROGRESS NOTES
Subjective     Dedrick Yi is a 64 year old male who presents to clinic today for the following health issues:      Ed has had LLQ abdominal pain for less than a week.  Seems to be getting better but still gets sharp pan.   Denies fever, nausea or changes in stool.  Had colonoscopy and has diverticuli in sigmoid       HPI   ABDOMINAL   PAIN     Onset: LAST THURSDAY    Description:   Character: Sharp pain when sitting up   Location: left lower quadrant  Radiation: None    Intensity: mild RIGHT NOW     Progression of Symptoms:  intermittent    Accompanying Signs & Symptoms:  Fever/Chills?: no   Gas/Bloating: YES  Nausea: no   Vomitting: no   Diarrhea?: no   Constipation:no   Dysuria or Hematuria: no    History:   Trauma: no   Previous similar pain: no    Previous tests done: none    Precipitating factors:   Does the pain change with:     Food: no      BM: no     Urination: no     Alleviating factors:      Therapies Tried and outcome:     LMP:           Current Outpatient Medications   Medication Sig Dispense Refill     latanoprost (XALATAN) 0.005 % ophthalmic solution 1 drop       losartan (COZAAR) 50 MG tablet Take 1 tablet (50 mg) by mouth daily 90 tablet 1     metroNIDAZOLE (FLAGYL) 500 MG tablet Take 1 tablet (500 mg) by mouth 2 times daily for 7 days 21 tablet 0     sulfamethoxazole-trimethoprim (BACTRIM DS) 800-160 MG tablet Take 1 tablet by mouth 2 times daily for 10 days 20 tablet 0     tamsulosin (FLOMAX) 0.4 MG capsule Take 1 capsule (0.4 mg) by mouth daily 90 capsule 3     tamsulosin (FLOMAX) 0.4 MG capsule Take 1 capsule (0.4 mg) by mouth daily 30 capsule 11     BP Readings from Last 3 Encounters:   03/30/20 (!) 143/82   12/04/19 (!) 150/90   08/09/19 134/80    Wt Readings from Last 3 Encounters:   03/30/20 122.7 kg (270 lb 6.4 oz)   12/04/19 113.4 kg (250 lb)   10/30/19 113.4 kg (250 lb)                      Reviewed and updated as needed this visit by Provider         Review of Systems   ROS COMP:  "Constitutional, HEENT, cardiovascular, pulmonary, gi and gu systems are negative, except as otherwise noted.      Objective    BP (!) 143/82 (BP Location: Right arm, Patient Position: Chair, Cuff Size: Adult Large)   Pulse 79   Temp 99  F (37.2  C) (Oral)   Ht 1.88 m (6' 2\")   Wt 122.7 kg (270 lb 6.4 oz)   SpO2 93%   BMI 34.72 kg/m    Body mass index is 34.72 kg/m .  Physical Exam   GENERAL: healthy, alert and no distress  RESP: lungs clear to auscultation - no rales, rhonchi or wheezes  CV: regular rate and rhythm, normal S1 S2, no S3 or S4, no murmur, click or rub, no peripheral edema and peripheral pulses strong  ABDOMEN: tenderness LLQ, bowel sounds normal     Diagnostic Test Results:  Labs reviewed in Epic        Assessment & Plan     1. Hypertension goal BP (blood pressure) < 140/90  Stable   - losartan (COZAAR) 50 MG tablet; Take 1 tablet (50 mg) by mouth daily  Dispense: 90 tablet; Refill: 1  - Basic metabolic panel    2. Impaired fasting glucose    - Basic metabolic panel  - Hemoglobin A1c    3. Diverticulosis of large intestine without hemorrhage  See pt nfo   - sulfamethoxazole-trimethoprim (BACTRIM DS) 800-160 MG tablet; Take 1 tablet by mouth 2 times daily for 10 days  Dispense: 20 tablet; Refill: 0  - metroNIDAZOLE (FLAGYL) 500 MG tablet; Take 1 tablet (500 mg) by mouth 2 times daily for 7 days  Dispense: 21 tablet; Refill: 0     BMI:   Estimated body mass index is 34.72 kg/m  as calculated from the following:    Height as of this encounter: 1.88 m (6' 2\").    Weight as of this encounter: 122.7 kg (270 lb 6.4 oz).               Return in about 1 week (around 4/6/2020), or if symptoms worsen or fail to improve.    Ramona Ann Aaseby-Aguilera, PA-C  [facility]        "

## 2020-03-30 NOTE — TELEPHONE ENCOUNTER
"  Additional Information    Negative: Passed out (i.e., fainted, collapsed and was not responding)    Negative: Shock suspected (e.g., cold/pale/clammy skin, too weak to stand, low BP, rapid pulse)    Negative: Sounds like a life-threatening emergency to the triager    Negative: Chest pain    Negative: Pain is mainly in upper abdomen (if needed ask: 'is it mainly above the belly button?')    Negative: SEVERE abdominal pain (e.g., excruciating)    Negative: Vomiting red blood or black (coffee ground) material    Negative: Bloody, black, or tarry bowel movements    Negative: Unable to urinate (or only a few drops) and bladder feels very full    Negative: Pain in scrotum persists > 1 hour    Negative: Constant abdominal pain lasting > 2 hours    Negative: Vomiting bile (green color)    Negative: Patient sounds very sick or weak to the triager    Negative: Vomiting and abdomen looks much more swollen than usual    Negative: White of the eyes have turned yellow (i.e., jaundice)    Negative: Blood in urine (red, pink, or tea-colored)    Negative: Fever > 103 F (39.4 C)    Negative: Fever > 101 F (38.3 C) and over 60 years of age    Negative: Fever > 100.0 F (37.8 C) and has diabetes mellitus or a weak immune system (e.g., HIV positive, cancer chemotherapy, organ transplant, splenectomy, chronic steroids)    Negative: Fever > 100.0 F (37.8 C) and bedridden (e.g., nursing home patient, stroke, chronic illness, recovering from surgery)    Age > 60 years    Answer Assessment - Initial Assessment Questions  1. LOCATION: \"Where does it hurt?\"        around belly button   2. RADIATION: \"Does the pain shoot anywhere else?\" (e.g., chest, back)    Just to the left of the belly button   3. ONSET: \"When did the pain begin?\" (Minutes, hours or days ago)       5 days   4. SUDDEN: \"Gradual or sudden onset?\"      Sudden onset   5. PATTERN \"Does the pain come and go, or is it constant?\"     - If constant: \"Is it getting better, staying " "the same, or worsening?\"       (Note: Constant means the pain never goes away completely; most serious pain is constant and it progresses)      - If intermittent: \"How long does it last?\" \"Do you have pain now?\"      (Note: Intermittent means the pain goes away completely between bouts)      Started as sharp \"burning pain\" that came and went.    6. SEVERITY: \"How bad is the pain?\"  (e.g., Scale 1-10; mild, moderate, or severe)     - MILD (1-3): doesn't interfere with normal activities, abdomen soft and not tender to touch      - MODERATE (4-7): interferes with normal activities or awakens from sleep, tender to touch      - SEVERE (8-10): excruciating pain, doubled over, unable to do any normal activities        6/10  At the worse   7. RECURRENT SYMPTOM: \"Have you ever had this type of abdominal pain before?\" If so, ask: \"When was the last time?\" and \"What happened that time?\"       No   8. CAUSE: \"What do you think is causing the abdominal pain?\"      No known injury   9. RELIEVING/AGGRAVATING FACTORS: \"What makes it better or worse?\" (e.g., movement, antacids, bowel movement)      Movement, getting ready to stand, coughing, deep breathing.  Better when just sitting.   10. OTHER SYMPTOMS: \"Has there been any vomiting, diarrhea, constipation, or urine problems?\"        Feels bloated.      Denies constipation or urinary issues    Protocols used: ABDOMINAL PAIN - MALE-A-OH    "

## 2020-03-30 NOTE — PATIENT INSTRUCTIONS
Patient Education     Discharge Instructions for Diverticulitis  You have been diagnosed with diverticulitis. This is a condition in which small pouches form in your colon (large intestine) and become inflamed or infected. Follow the guidelines below for home care.  As you recover  Tips for recovery include:    Eat a low-fiber diet. Your healthcare provider may advise a liquid diet. This gives your bowel a chance to rest so that it can recover.    Foods to include: flake cereal, mashed potatoes, pancakes, waffles, pasta, white bread, rice, applesauce, bananas, eggs, fish, poultry, tofu, and well-cooked vegetables    Take your medicines as directed. Do not stop taking the medicines, even if you feel better.    Monitor your temperature and report any rising temperature to your healthcare provider.    Take antibiotics exactly as directed. Do not miss any and keep taking them even if you feel better.     Drink 6 to 8 glasses of water every day, unless directed otherwise.    Use a heating pad or hot water bottle to reduce abdominal cramping or pain.  Preventing diverticulitis in the future  Tips for prevention include:    Eat a high-fiber diet. Fiber adds bulk to the stool so that it passes through the large intestine more easily.    Keep drinking 6 to 8 glasses of water every day, unless directed otherwise.    Begin an exercise program. Ask your healthcare provider how to get started. You can benefit from simple activities such as walking or gardening.    Treat diarrhea with a bland diet. Start with liquids only, then slowly add fiber over time.    Watch for changes in your bowel movements (constipation to diarrhea).    Avoid constipation with fiber and add a stool softener if needed.     Get plenty of rest and sleep.  Follow-up care  Make a follow-up appointment as directed by our staff.  When to call your healthcare provider  Call your healthcare provider immediately if you have any of the following:    Fever of  100.4 F (38.0 C) or higher, or as directed by your healthcare provider    Chills    Severe cramps in the belly, most commonly the lower left side    Tenderness in the belly, most commonly the lower left side    Nausea and vomiting    Bleeding from your rectum   Date Last Reviewed: 7/1/2016 2000-2019 [employer]. [address] [place], [place] [id number]. All rights reserved. This information is not intended as a substitute for professional medical care. Always follow your healthcare professional's instructions.

## 2020-03-30 NOTE — TELEPHONE ENCOUNTER
Dedrick has been having lower abdominal pain since last Thursday.    Now is starting to feel bloated.    Pain is 2 inches below his navel.    No fever.  Feels fine other than the abdominal pain

## 2020-04-01 DIAGNOSIS — I10 BENIGN ESSENTIAL HYPERTENSION: ICD-10-CM

## 2020-04-01 RX ORDER — LOSARTAN POTASSIUM AND HYDROCHLOROTHIAZIDE 12.5; 5 MG/1; MG/1
TABLET ORAL
Qty: 90 TABLET | Refills: 3 | OUTPATIENT
Start: 2020-04-01

## 2020-04-14 ENCOUNTER — TELEPHONE (OUTPATIENT)
Dept: FAMILY MEDICINE | Facility: CLINIC | Age: 65
End: 2020-04-14

## 2020-04-14 ENCOUNTER — VIRTUAL VISIT (OUTPATIENT)
Dept: FAMILY MEDICINE | Facility: CLINIC | Age: 65
End: 2020-04-14
Payer: COMMERCIAL

## 2020-04-14 DIAGNOSIS — I10 BENIGN ESSENTIAL HYPERTENSION: Primary | ICD-10-CM

## 2020-04-14 DIAGNOSIS — K57.30 DIVERTICULOSIS OF LARGE INTESTINE WITHOUT HEMORRHAGE: ICD-10-CM

## 2020-04-14 PROCEDURE — 99213 OFFICE O/P EST LOW 20 MIN: CPT | Mod: 95 | Performed by: PHYSICIAN ASSISTANT

## 2020-04-14 NOTE — TELEPHONE ENCOUNTER
General Call:   Who is calling:  Ed  Reason for Call:  Nurse advise  What are your questions or concerns:  Patient has some questions regarding his blood pressure  Date of last appointment with provider: 03/30/20 Ramona Aaseby-Aguilera Okay to leave a detailed message:Yes at Cell number on file:    Telephone Information:   Mobile [phone]      Alana Jolley

## 2020-04-14 NOTE — TELEPHONE ENCOUNTER
Pt calling has had some lightheadedness when standing up from sitting and BP's have been lower lately.     He has stopped drinking alcohol, none for the last 2 weeks     BP has been as low as 117/63 and even before taking meds only 149/81.  He has not checked pulse.     He will check BP and HR before telephone visit today     Sahra Pepper RN

## 2020-04-14 NOTE — PROGRESS NOTES
"Dedrick Yi is a 64 year old male who is being evaluated via a billable telephone visit.      The patient has been notified of following:     \"This telephone visit will be conducted via a call between you and your physician/provider. We have found that certain health care needs can be provided without the need for a physical exam.  This service lets us provide the care you need with a short phone conversation.  If a prescription is necessary we can send it directly to your pharmacy.  If lab work is needed we can place an order for that and you can then stop by our lab to have the test done at a later time.    Telephone visits are billed at different rates depending on your insurance coverage. During this emergency period, for some insurers they may be billed the same as an in-person visit.  Please reach out to your insurance provider with any questions.    If during the course of the call the physician/provider feels a telephone visit is not appropriate, you will not be charged for this service.\"    Patient has given verbal consent for Telephone visit?  Yes    How would you like to obtain your AVS? E-Mail (inform patient AVS not encrypted)    Subjective     Dedrick Yi is a 64 year old male who presents to clinic today for the following health issues:    Hypertension Follow-up      Do you check your blood pressure regularly outside of the clinic? Yes pt has been experiencing lightheadedness the past week.     Are you following a low salt diet? Yes    Are your blood pressures ever more than 140 on the top number (systolic) OR more   than 90 on the bottom number (diastolic), for example 140/90? Yes, a few days ago it was 149       How many servings of fruits and vegetables do you eat daily?  2-3    On average, how many sweetened beverages do you drink each day (Examples: soda, juice, sweet tea, etc.  Do NOT count diet or artificially sweetened beverages)?   0    How many days per week do you exercise enough to " make your heart beat faster? 3 or less    How many minutes a day do you exercise enough to make your heart beat faster? 9 or less    How many days per week do you miss taking your medication? 0    @Is calling to follow-up on his blood pressure: He was treated for diverticulitis with a antibiotic and could not drink alcohol while he was on this and has noted that he is getting up less at night to pee because of that but has also noticed that he gets a little lightheaded when he stands up fast.  He has been taking his blood pressure and he is getting anywhere from 116/72  To 150s over 90, but most of the blood pressures have been around the 130/80 range              Reviewed and updated as needed this visit by Provider         Review of Systems   ROS COMP: CONSTITUTIONAL: NEGATIVE for fever, chills, change in weight  INTEGUMENTARY/SKIN: NEGATIVE for worrisome rashes, moles or lesions  EYES: NEGATIVE for vision changes or irritation  ENT/MOUTH: NEGATIVE for ear, mouth and throat problems  RESP: NEGATIVE for significant cough or SOB  BREAST: NEGATIVE for masses, tenderness or discharge  CV: NEGATIVE for chest pain, palpitations or peripheral edema  GI: NEGATIVE for nausea, abdominal pain, heartburn, or change in bowel habits  : NEGATIVE for frequency, dysuria, or hematuria  MUSCULOSKELETAL: NEGATIVE for significant arthralgias or myalgia  NEURO: POSITIVE for dizziness/lightheadedness  ENDOCRINE: NEGATIVE for temperature intolerance, skin/hair changes  HEME: NEGATIVE for bleeding problems  PSYCHIATRIC: NEGATIVE for changes in mood or affect           Assessment/Plan:  1. Benign essential hypertension  After discussing we agreed to stay on the losartan 50 mg because I think if we decrease that his blood pressures would definitely run on the higher end of normal if not hypertensive again.  Advised that he keep hydrated and get up slowly if he continues to be dizzy with this.  He has been on losartan for many years.    2.  Diverticulosis of large intestine without hemorrhage  Due to COVID-19 when he was seen for left lower quadrant pain we assumed he had diverticulitis and he was treated with antibiotics and states that his symptoms resolved.      No follow-ups on file.      Phone call duration:  15 minutes    Ramona Ann Aaseby-Aguilera, PA-C

## 2020-08-26 DIAGNOSIS — I10 HYPERTENSION GOAL BP (BLOOD PRESSURE) < 140/90: ICD-10-CM

## 2020-08-26 RX ORDER — LOSARTAN POTASSIUM 50 MG/1
50 TABLET ORAL DAILY
Qty: 90 TABLET | Refills: 0 | Status: SHIPPED | OUTPATIENT
Start: 2020-08-26

## 2020-08-26 NOTE — TELEPHONE ENCOUNTER
Prescription approved per [facility] Refill Protocol.  Appears [place] is in [place]  Sent one time refill as unable to get ahold of pt  Julio Cesar Rangel RN, BSN

## 2021-01-14 ENCOUNTER — HEALTH MAINTENANCE LETTER (OUTPATIENT)
Age: 66
End: 2021-01-14

## 2021-05-09 ENCOUNTER — HEALTH MAINTENANCE LETTER (OUTPATIENT)
Age: 66
End: 2021-05-09

## 2021-10-24 ENCOUNTER — HEALTH MAINTENANCE LETTER (OUTPATIENT)
Age: 66
End: 2021-10-24

## 2022-06-05 ENCOUNTER — HEALTH MAINTENANCE LETTER (OUTPATIENT)
Age: 67
End: 2022-06-05

## 2022-10-15 ENCOUNTER — HEALTH MAINTENANCE LETTER (OUTPATIENT)
Age: 67
End: 2022-10-15

## 2023-06-11 ENCOUNTER — HEALTH MAINTENANCE LETTER (OUTPATIENT)
Age: 68
End: 2023-06-11

## (undated) DEVICE — KIT ENDO TURNOVER/PROCEDURE W/CLEAN A SCOPE LINERS 103888

## (undated) DEVICE — ENDO TRAP POLYP QUICK CATCH 710201

## (undated) DEVICE — ENDO SNARE POLYPECTOMY OVAL 15MM LOOP SD-240U-15

## (undated) RX ORDER — FENTANYL CITRATE 50 UG/ML
INJECTION, SOLUTION INTRAMUSCULAR; INTRAVENOUS
Status: DISPENSED
Start: 2019-06-03